# Patient Record
Sex: FEMALE | Race: WHITE | NOT HISPANIC OR LATINO | Employment: UNEMPLOYED | URBAN - NONMETROPOLITAN AREA
[De-identification: names, ages, dates, MRNs, and addresses within clinical notes are randomized per-mention and may not be internally consistent; named-entity substitution may affect disease eponyms.]

---

## 2018-04-11 ENCOUNTER — APPOINTMENT (OUTPATIENT)
Dept: RADIOLOGY | Facility: CLINIC | Age: 43
End: 2018-04-11
Payer: COMMERCIAL

## 2018-04-11 ENCOUNTER — OFFICE VISIT (OUTPATIENT)
Dept: URGENT CARE | Facility: CLINIC | Age: 43
End: 2018-04-11
Payer: COMMERCIAL

## 2018-04-11 VITALS
SYSTOLIC BLOOD PRESSURE: 132 MMHG | RESPIRATION RATE: 18 BRPM | DIASTOLIC BLOOD PRESSURE: 72 MMHG | TEMPERATURE: 99.1 F | OXYGEN SATURATION: 96 % | HEART RATE: 86 BPM

## 2018-04-11 DIAGNOSIS — M25.572 ACUTE LEFT ANKLE PAIN: ICD-10-CM

## 2018-04-11 DIAGNOSIS — S82.832A CLOSED FRACTURE OF DISTAL END OF LEFT FIBULA, UNSPECIFIED FRACTURE MORPHOLOGY, INITIAL ENCOUNTER: Primary | ICD-10-CM

## 2018-04-11 PROCEDURE — 99283 EMERGENCY DEPT VISIT LOW MDM: CPT | Performed by: PHYSICIAN ASSISTANT

## 2018-04-11 PROCEDURE — 29515 APPLICATION SHORT LEG SPLINT: CPT | Performed by: PHYSICIAN ASSISTANT

## 2018-04-11 PROCEDURE — G0382 LEV 3 HOSP TYPE B ED VISIT: HCPCS | Performed by: PHYSICIAN ASSISTANT

## 2018-04-11 PROCEDURE — 73610 X-RAY EXAM OF ANKLE: CPT

## 2018-04-11 NOTE — PROGRESS NOTES
[address]  (office) [phone]  (fax) [phone]        NAME: Don Garcia is a 38 y o  female  : 1975    MRN: [id number]  DATE: 2018  TIME: 6:29 PM    Assessment and Plan   Closed fracture of distal end of left fibula, unspecified fracture morphology, initial encounter [P20 642O]  1  Closed fracture of distal end of left fibula, unspecified fracture morphology, initial encounter     2  Acute left ankle pain  XR ankle 3+ vw left    Splint application       Patient Instructions   Xray shows acute nondisplaced fracture of the distal left fibula  Posterior short leg splint applied  Crutches given to patient and educated on proper use  Educated on splint maintanance and to keep on until she sees ortho  To keep dry  To see ortho ASAP  Given [facility] contact info  To present to the ER if symptoms worsen  Chief Complaint     Chief Complaint   Patient presents with    Ankle Pain     Mario Loge yesterday c/o left ankle pain and swelling  Brooke Vu LPN          History of Present Illness   Don Garcia presents to the clinic c/o    Ankle Injury    The incident occurred 12 to 24 hours ago  The incident occurred at home  The injury mechanism was a fall  The pain is present in the left ankle  The pain is at a severity of 5/10  The pain is moderate  The pain has been constant since onset  Pertinent negatives include no inability to bear weight, loss of motion, loss of sensation, muscle weakness, numbness or tingling  She reports no foreign bodies present  The symptoms are aggravated by movement and weight bearing  She has tried nothing for the symptoms  The treatment provided no relief  Review of Systems   Review of Systems   Constitutional: Negative for activity change, appetite change, chills, diaphoresis, fatigue and fever     HENT: Negative for congestion, ear discharge, ear pain, facial swelling, rhinorrhea, sinus pain, sinus pressure, sneezing and sore throat  Eyes: Negative for photophobia, pain, discharge, redness, itching and visual disturbance  Respiratory: Negative for apnea, cough, chest tightness, shortness of breath and wheezing  Cardiovascular: Negative for chest pain  Gastrointestinal: Negative for abdominal distention, abdominal pain, anal bleeding, blood in stool, constipation, diarrhea, nausea and vomiting  Genitourinary: Negative for dysuria, flank pain, frequency, hematuria and urgency  Musculoskeletal: Positive for arthralgias and gait problem  Negative for back pain, joint swelling, myalgias, neck pain and neck stiffness  Skin: Negative for color change, rash and wound  Allergic/Immunologic: Negative for immunocompromised state  Neurological: Negative for dizziness, tingling, facial asymmetry, numbness and headaches  Hematological: Negative for adenopathy  Psychiatric/Behavioral: Negative for confusion and decreased concentration  Current Medications     No long-term prescriptions on file  Current Allergies     Allergies as of 04/11/2018    (No Known Allergies)            The following portions of the patient's history were reviewed and updated as appropriate: allergies, current medications, past family history, past medical history, past social history, past surgical history and problem list   No past medical history on file  No past surgical history on file  Social History     Social History    Marital status: /Civil Union     Spouse name: N/A    Number of children: N/A    Years of education: N/A     Occupational History    Not on file       Social History Main Topics    Smoking status: Never Smoker    Smokeless tobacco: Never Used    Alcohol use Not on file    Drug use: Unknown    Sexual activity: Not on file     Other Topics Concern    Not on file     Social History Narrative    No narrative on file       Objective   /72   Pulse 86   Temp 99 1 °F (37 3 °C) (Tympanic)   Resp 18   SpO2 96%      Physical Exam     Physical Exam   Constitutional: She is oriented to person, place, and time  She appears well-developed and well-nourished  No distress  HENT:   Head: Normocephalic and atraumatic  Right Ear: Tympanic membrane and external ear normal    Left Ear: Tympanic membrane and external ear normal    Nose: Nose normal    Mouth/Throat: Oropharynx is clear and moist  No oropharyngeal exudate  Eyes: Conjunctivae and EOM are normal  Pupils are equal, round, and reactive to light  Right eye exhibits no discharge  Left eye exhibits no discharge  No scleral icterus  Neck: Normal range of motion  Neck supple  No JVD present  No tracheal deviation present  No thyromegaly present  Cardiovascular: Normal rate, regular rhythm and normal heart sounds  Exam reveals no gallop and no friction rub  No murmur heard  Pulmonary/Chest: Effort normal and breath sounds normal  No stridor  No respiratory distress  She has no wheezes  She has no rales  She exhibits no tenderness  Abdominal: Soft  Bowel sounds are normal  She exhibits no distension and no mass  There is no tenderness  There is no rebound and no guarding  Musculoskeletal: Normal range of motion  She exhibits edema and tenderness  She exhibits no deformity  Feet:    Lymphadenopathy:     She has no cervical adenopathy  Neurological: She is alert and oriented to person, place, and time  She has normal reflexes  Coordination normal    Skin: Skin is warm and dry  No rash noted  She is not diaphoretic  No erythema  No pallor  Psychiatric: She has a normal mood and affect  Her behavior is normal  Judgment and thought content normal    Nursing note and vitals reviewed        Talita Ruiz PA-C

## 2018-04-12 ENCOUNTER — OFFICE VISIT (OUTPATIENT)
Dept: OBGYN CLINIC | Facility: CLINIC | Age: 43
End: 2018-04-12
Payer: COMMERCIAL

## 2018-04-12 VITALS
BODY MASS INDEX: 31.54 KG/M2 | WEIGHT: 178 LBS | HEIGHT: 63 IN | SYSTOLIC BLOOD PRESSURE: 118 MMHG | DIASTOLIC BLOOD PRESSURE: 82 MMHG | HEART RATE: 78 BPM

## 2018-04-12 DIAGNOSIS — S82.65XA CLOSED NONDISPLACED FRACTURE OF LATERAL MALLEOLUS OF LEFT FIBULA, INITIAL ENCOUNTER: Primary | ICD-10-CM

## 2018-04-12 PROCEDURE — 27780 TREATMENT OF FIBULA FRACTURE: CPT | Performed by: ORTHOPAEDIC SURGERY

## 2018-04-12 PROCEDURE — 99203 OFFICE O/P NEW LOW 30 MIN: CPT | Performed by: ORTHOPAEDIC SURGERY

## 2018-04-12 RX ORDER — IBUPROFEN 200 MG
TABLET ORAL EVERY 6 HOURS PRN
COMMUNITY

## 2018-04-12 RX ORDER — MULTIVITAMIN
1 TABLET ORAL DAILY
COMMUNITY

## 2018-04-12 NOTE — ASSESSMENT & PLAN NOTE
Findings consistent with left ankle lateral malleolus nondisplaced fracture  Discussed findings and treatment options with the patient  I reviewed patient's x-ray with her  I discussed prognosis of her left ankle injury  I recommended patient be placed in a short-leg weight-bearing cast for 6 weeks  Cast care instruction was provided  Advised patient to continue ankle elevation and cold compress  Re-evaluate in 6 weeks with x-ray out of the cast   All patient's questions were answered to his satisfaction  This note is created using dictation transcription  It may contain typographical errors, grammatical errors, improperly dictated words, background noise and other errors

## 2018-04-12 NOTE — PROGRESS NOTES
Assessment:     1  Closed nondisplaced fracture of lateral malleolus of left fibula, initial encounter        Plan:     Problem List Items Addressed This Visit        Musculoskeletal and Integument    Closed nondisplaced fracture of lateral malleolus of left fibula - Primary     Findings consistent with left ankle lateral malleolus nondisplaced fracture  Discussed findings and treatment options with the patient  I reviewed patient's x-ray with her  I discussed prognosis of her left ankle injury  I recommended patient be placed in a short-leg weight-bearing cast for 6 weeks  Cast care instruction was provided  Advised patient to continue ankle elevation and cold compress  Re-evaluate in 6 weeks with x-ray out of the cast   All patient's questions were answered to his satisfaction  This note is created using dictation transcription  It may contain typographical errors, grammatical errors, improperly dictated words, background noise and other errors  Relevant Orders    Durable Medical Equipment         Subjective:     Patient ID: Rajeev Barrios is a 38 y o  female  Chief Complaint:  75-year-old white female who injured her left ankle on 4/10/2018 when she missed a step and her left ankle gave out on her     She twisted her left ankle with immediate development pain over the outer aspect of her ankle  Patient was seen in urgent care yesterday and placed in a splint with crutches  She has not been weight-bearing on the left leg  She denies any other injury  Information on patient's intake form was reviewed  Allergy:  No Known Allergies  Medications:  all current active meds have been reviewed  Past Medical History:  History reviewed  No pertinent past medical history  Past Surgical History:  History reviewed  No pertinent surgical history    Family History:  Family History   Problem Relation Age of Onset    Atrial fibrillation Mother     Multiple sclerosis Father      Social History:  History Alcohol use Not on file     History   Drug use: Unknown     History   Smoking Status    Never Smoker   Smokeless Tobacco    Never Used     Review of Systems   Constitutional: Negative  HENT: Negative  Eyes: Negative  Respiratory: Negative  Cardiovascular: Negative  Gastrointestinal: Negative  Endocrine: Negative  Genitourinary: Negative  Musculoskeletal: Positive for arthralgias (Left ankle), gait problem (Using crutches and not weight-bearing on the leftlower leg) and joint swelling (Left ankle)  Skin: Negative  Allergic/Immunologic: Negative  Hematological: Negative  Psychiatric/Behavioral: Negative  Objective:  BP Readings from Last 1 Encounters:   04/12/18 118/82      Wt Readings from Last 1 Encounters:   04/12/18 80 7 kg (178 lb)      BMI:   Estimated body mass index is 31 53 kg/m² as calculated from the following:    Height as of this encounter: 5' 3" (1 6 m)  Weight as of this encounter: 80 7 kg (178 lb)  BSA:   Estimated body surface area is 1 84 meters squared as calculated from the following:    Height as of this encounter: 5' 3" (1 6 m)  Weight as of this encounter: 80 7 kg (178 lb)  Physical Exam   Constitutional: She is oriented to person, place, and time  She appears well-developed  HENT:   Head: Normocephalic and atraumatic  Eyes: EOM are normal  Pupils are equal, round, and reactive to light  Neck: Neck supple  Neurological: She is alert and oriented to person, place, and time  Skin: Skin is warm  Psychiatric: She has a normal mood and affect  Nursing note and vitals reviewed  Right Ankle Exam   Right ankle exam is normal       Left Ankle Exam   Swelling: moderate    Tenderness   The patient is experiencing tenderness in the lateral malleolus (No tenderness over the medial malleolus and the deltoid ligament)       Range of Motion   Dorsiflexion: abnormal   Plantar flexion: abnormal   Inversion: abnormal   Eversion: abnormal Other   Erythema: absent  Sensation: normal  Pulse: present    Comments:  Ecchymosis over the lateral aspect of the ankle  No gross deformity  I have personally reviewed pertinent films in PACS and my interpretation is Left ankle show nondisplaced lateral malleolus fracture  Mortise and syndesmosis are intact       Cast application  Date/Time: 4/12/2018 1:37 PM  Performed by: Yvonne Potter  Authorized by: Yvonne Potter     Consent:     Consent obtained:  Verbal    Consent given by:  Patient  Pre-procedure details:     Sensation:  Normal  Procedure details:     Laterality:  Left    Location:  Ankle    Ankle:  L ankleCast type:  Short leg walking    Supplies:  Cotton padding and fiberglass

## 2018-04-19 ENCOUNTER — TELEPHONE (OUTPATIENT)
Dept: OBGYN CLINIC | Facility: HOSPITAL | Age: 43
End: 2018-04-19

## 2018-04-19 NOTE — TELEPHONE ENCOUNTER
Dr Lexy Quesada - L Lateral Malleolus fx - non displaced seen on 4/12    Patient calling that her swelling goes down when foot is elevated and comes back and seems tight at the ankle when she puts her foot down  She is bearing weight to the leg  Advised that this is normal, elevate and ice  Check cap refill  and foot temp  If Cap refill is >3 seconds and foot is cold, call the office  Patient verbalized understanding  Please advise

## 2018-05-24 ENCOUNTER — APPOINTMENT (OUTPATIENT)
Dept: RADIOLOGY | Facility: CLINIC | Age: 43
End: 2018-05-24
Payer: COMMERCIAL

## 2018-05-24 ENCOUNTER — OFFICE VISIT (OUTPATIENT)
Dept: OBGYN CLINIC | Facility: CLINIC | Age: 43
End: 2018-05-24

## 2018-05-24 VITALS
DIASTOLIC BLOOD PRESSURE: 80 MMHG | HEART RATE: 82 BPM | WEIGHT: 178 LBS | SYSTOLIC BLOOD PRESSURE: 130 MMHG | BODY MASS INDEX: 31.54 KG/M2 | HEIGHT: 63 IN

## 2018-05-24 DIAGNOSIS — S82.65XD NONDISPLACED FRACTURE OF LATERAL MALLEOLUS OF LEFT FIBULA, SUBSEQUENT ENCOUNTER FOR CLOSED FRACTURE WITH ROUTINE HEALING: Primary | ICD-10-CM

## 2018-05-24 DIAGNOSIS — S82.892S CLOSED FRACTURE OF LEFT ANKLE, SEQUELA: ICD-10-CM

## 2018-05-24 PROCEDURE — 99024 POSTOP FOLLOW-UP VISIT: CPT | Performed by: ORTHOPAEDIC SURGERY

## 2018-05-24 PROCEDURE — 73610 X-RAY EXAM OF ANKLE: CPT

## 2018-05-24 NOTE — ASSESSMENT & PLAN NOTE
Findings consistent with left nondisplaced lateral malleolus fracture-healing  Findings and treatment options were discussed with the patient  Short-leg cast was removed  She was given a prescription for a CAM walker to use when she is ambulating  She may remove it at rest for range of motion  She was given a prescription to start physical therapy  Continue ice and elevation  Follow up in 4 weeks with repeat x-rays  We will most likely transition her to an ankle brace at that time  All questions were answered to patient's satisfaction

## 2018-05-24 NOTE — PROGRESS NOTES
Assessment:     1  Nondisplaced fracture of lateral malleolus of left fibula, subsequent encounter for closed fracture with routine healing        Plan:  The patient was seen and examined by Dr Anand Burgos and myself  Problem List Items Addressed This Visit        Musculoskeletal and Integument    Nondisplaced fracture of lateral malleolus of left fibula, subsequent encounter for closed fracture with routine healing - Primary     Findings consistent with left nondisplaced lateral malleolus fracture-healing  Findings and treatment options were discussed with the patient  Short-leg cast was removed  She was given a prescription for a CAM walker to use when she is ambulating  She may remove it at rest for range of motion  She was given a prescription to start physical therapy  Continue ice and elevation  Follow up in 4 weeks with repeat x-rays  We will most likely transition her to an ankle brace at that time  All questions were answered to patient's satisfaction  Relevant Orders    XR ankle 3+ vw left    Cam Boot    Ambulatory referral to Physical Therapy         Subjective:     Patient ID: Kaylee Chao is a 38 y o  female  Chief Complaint:  25-year-old white female who is following up for a left nondisplaced lateral malleolus fracture  She injured her left ankle on 4/10/2018 when she missed a step and her left ankle gave out on her     She twisted her left ankle with immediate development pain over the outer aspect of her ankle  Patient was seen in urgent care yesterday and placed in a splint with crutches  She has been nonweightbearing with a short-leg cast   She tolerated the cast well  Ice and elevation has been helping decrease pain  Allergy:  No Known Allergies  Medications:  all current active meds have been reviewed  Past Medical History:  History reviewed  No pertinent past medical history  Past Surgical History:  History reviewed  No pertinent surgical history    Family History:  Family History   Problem Relation Age of Onset    Atrial fibrillation Mother     Multiple sclerosis Father      Social History:  History   Alcohol use Not on file     History   Drug use: Unknown     History   Smoking Status    Never Smoker   Smokeless Tobacco    Never Used     Review of Systems   Constitutional: Negative  HENT: Negative  Eyes: Negative  Respiratory: Negative  Cardiovascular: Negative  Gastrointestinal: Negative  Endocrine: Negative  Genitourinary: Negative  Musculoskeletal: Positive for arthralgias (Left ankle), gait problem (Using crutches and not weight-bearing on the leftlower leg) and joint swelling (Left ankle)  Skin: Negative  Allergic/Immunologic: Negative  Hematological: Negative  Psychiatric/Behavioral: Negative  Objective:  BP Readings from Last 1 Encounters:   05/24/18 130/80      Wt Readings from Last 1 Encounters:   05/24/18 80 7 kg (178 lb)      BMI:   Estimated body mass index is 31 53 kg/m² as calculated from the following:    Height as of this encounter: 5' 3" (1 6 m)  Weight as of this encounter: 80 7 kg (178 lb)  BSA:   Estimated body surface area is 1 84 meters squared as calculated from the following:    Height as of this encounter: 5' 3" (1 6 m)  Weight as of this encounter: 80 7 kg (178 lb)  Physical Exam   Constitutional: She is oriented to person, place, and time  She appears well-developed  HENT:   Head: Normocephalic and atraumatic  Eyes: EOM are normal  Pupils are equal, round, and reactive to light  Neck: Neck supple  Neurological: She is alert and oriented to person, place, and time  Skin: Skin is warm  Psychiatric: She has a normal mood and affect  Nursing note and vitals reviewed  Right Ankle Exam   Right ankle exam is normal       Left Ankle Exam   Swelling: mild    Tenderness   The patient is experiencing no tenderness (No tenderness over the medial malleolus and the deltoid ligament)       Range of Motion   Dorsiflexion: abnormal   Plantar flexion: abnormal   Inversion: abnormal   Eversion: abnormal     Other   Erythema: absent  Sensation: normal  Pulse: present            I have personally reviewed pertinent films in PACS and my interpretation is Left ankle show nondisplaced lateral malleolus fracture  Mortise and syndesmosis are intact  There is evidence of healing compared to last films  Procedures Short leg cast removed

## 2018-06-14 ENCOUNTER — EVALUATION (OUTPATIENT)
Dept: PHYSICAL THERAPY | Facility: CLINIC | Age: 43
End: 2018-06-14
Payer: COMMERCIAL

## 2018-06-14 DIAGNOSIS — S82.65XD CLOSED NONDISPLACED FRACTURE OF LATERAL MALLEOLUS OF LEFT FIBULA WITH ROUTINE HEALING: Primary | ICD-10-CM

## 2018-06-14 DIAGNOSIS — M25.572 ACUTE LEFT ANKLE PAIN: ICD-10-CM

## 2018-06-14 PROCEDURE — G8978 MOBILITY CURRENT STATUS: HCPCS | Performed by: PHYSICAL THERAPIST

## 2018-06-14 PROCEDURE — G8979 MOBILITY GOAL STATUS: HCPCS | Performed by: PHYSICAL THERAPIST

## 2018-06-14 PROCEDURE — 97535 SELF CARE MNGMENT TRAINING: CPT | Performed by: PHYSICAL THERAPIST

## 2018-06-14 PROCEDURE — 97110 THERAPEUTIC EXERCISES: CPT | Performed by: PHYSICAL THERAPIST

## 2018-06-14 PROCEDURE — 97162 PT EVAL MOD COMPLEX 30 MIN: CPT | Performed by: PHYSICAL THERAPIST

## 2018-06-14 NOTE — LETTER
2018    Inés Bartlett PA-C  [address] [address]    Patient: Margarita Gandara   YOB: 1975   Date of Visit: 2018     Encounter Diagnosis     ICD-10-CM    1  Closed nondisplaced fracture of lateral malleolus of left fibula with routine healing S82 65XD    2  Acute left ankle pain M25 572        Dear Dr Xiomara Low:    Please review the attached Plan of Care from [facility] recent visit  Please verify that you agree therapy should continue by signing the attached document and sending it back to our office  If you have any questions or concerns, please don't hesitate to call  Sincerely,    Jaya Andrew, PT      Referring Provider:      I certify that I have read the below Plan of Care and certify the need for these services furnished under this plan of treatment while under my care  Insé Bartlett PA-C  [address]  [address]  [address] [place] [address] [address] [address] [place]          PT Evaluation     Today's date: 2018  Patient name: Margarita Gandara  : 1975  MRN: [id number]  Referring provider: Dmitriy Castro PA-C  Dx:   Encounter Diagnosis     ICD-10-CM    1  Closed nondisplaced fracture of lateral malleolus of left fibula with routine healing S82 65XD    2  Acute left ankle pain M25 572        Start Time: 0800  Stop Time: 0900  Total time in clinic (min): 60 minutes    Assessment  Impairments: abnormal gait, abnormal or restricted ROM, activity intolerance, impaired balance, impaired physical strength, lacks appropriate home exercise program, pain with function and weight-bearing intolerance    Assessment details: Pt presents s/p fall on 18 resulting in closed non-displaced fracture of lateral malleolus of left fibula, currently WBAT in CAM boot on LLE   PT notes the patient demonstrates decreased gait mechanics ambulating with CAM boot and no AD, decreased weight bearing tolerance, decreased AROM of the L ankle, decreased mm strength, impaired balance, and decreased functional mobility  Pt would benefit from skilled PT to improve mm strength, ROM, balance, gait mechanics, and functional mobility  Understanding of Dx/Px/POC: good   Prognosis: good    Goals  STG  1  Pt will demonstrate 0/10 pain with ADLs and functional mobility  2  Pt will improve AROM of the L ankle to WNL  3  Initiate HEP with good tolerance from pt    LTG   1  Pt will improve standing static and dynamic balance to WNL  2  Pt will demonstrate 5/5 t/o BLE   3  Pt will be independent with HEP and appropriate for d/c    Plan  Patient would benefit from: PT eval and skilled physical therapy  Planned modality interventions: cryotherapy, unattended electrical stimulation and thermotherapy: hydrocollator packs  Planned therapy interventions: joint mobilization, massage, manual therapy, neuromuscular re-education, balance, balance/weight bearing training, flexibility, gait training, graded exercise, home exercise program, patient education, postural training, strengthening, stretching and therapeutic exercise  Frequency: 2x week  Duration in visits: 8  Duration in weeks: 4  Treatment plan discussed with: patient  Plan details: Discussed findings of POC with pt, pt agreeable to skilled PT 2x/wk for 4 wks        Subjective Evaluation    History of Present Illness  Date of onset: 2018  Mechanism of injury: Pt presents s/p fall resulting in non-displaced fx of the L lateral malleolus on 18  Pt reports she was going down on the stairs and felt the ankle give out while she was stepping down onto her left foot  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, and has rolled her L ankle several times, but none as serious as this  Pt was originally placed in short cast after injury, but was transitioned to CAM boot at recent f/u appt with MD on   Pt is scheduled to f/u with MD on      Not a recurrent problem   Quality of life: good    Pain  Current pain ratin  At best pain ratin  At worst pain ratin  Location: Left ankle  Quality: discomfort  Relieving factors: rest  Aggravating factors: standing and walking  Progression: improved      Diagnostic Tests  X-ray: abnormal  Treatments  No previous or current treatments  Patient Goals  Patient goals for therapy: decreased edema, decreased pain, improved balance, increased motion, increased strength and return to sport/leisure activities        Objective     Palpation     Additional Palpation Details  Pt notes no TTP of b/l LE musculature     Tenderness   Left Ankle/Foot   Tenderness in the lateral malleolus  Right Ankle/Foot   No tenderness  Additional Tenderness Details  TTP noted over L lateral malleolus, no other tenderness noted     Neurological Testing     Reflexes   Left   Patellar (L4): normal (2+)  Achilles (S1): normal (2+)    Right   Patellar (L4): normal (2+)  Achilles (S1): normal (2+)    Active Range of Motion   Left Ankle/Foot   Dorsiflexion (ke): 15 degrees   Plantar flexion: 45 degrees   Inversion: 30 degrees   Eversion: 15 degrees     Right Ankle/Foot   Dorsiflexion (ke): 20 degrees   Plantar flexion: 50 degrees   Inversion: 30 degrees   Eversion: 25 degrees     Additional Active Range of Motion Details  PT notes the pt with slightly decreased AROM of the L ankle compared to R ankle, no pain with AROM    Strength/Myotome Testing     Left Ankle/Foot   Dorsiflexion: 3+  Plantar flexion: 3+  Inversion: 3+  Eversion: 3+    Right Ankle/Foot   Normal strength    Additional Strength Details  Pt noted "stiffness" with MMT of the L ankle  B/L hip and knee mm strength WNL    Swelling   Left Ankle/Foot   Figure 8: 56 cm    Right Ankle/Foot   Figure 8: 55 cm    Ambulation   Weight-Bearing Status   Weight-Bearing Status (Left): weight-bearing as tolerated   with CAM boot    Additional Weight-Bearing Status Details  CAM boot on LLE   Pt notes she is allowed to remove CAM boot for ROM     Ambulation: Level Surfaces Ambulation with assistive device: independent  Ambulation without assistive device: independent    Observational Gait   Gait: antalgic   Walking speed and stride length within functional limits  Decreased left step length     Left foot contact pattern: foot flat  Left arm swing: within functional limits  Right arm swing: within functional limits  Base of support: normal      Flowsheet Rows      Most Recent Value   PT/OT G-Codes   Current Score  47   Projected Score  64   FOTO information reviewed  Yes   Assessment Type  Evaluation   G code set  Mobility: Walking & Moving Around   Mobility: Walking and Moving Around Current Status ()  CK   Mobility: Walking and Moving Around Goal Status ()  CJ          Precautions: WBAT LLE in CAM boot    Specialty Daily Treatment Diary     Manual  6/14/18       L foot/ankle PROM and joint mobs                            Exercise Diary  6/14/18       [address] [facility] L2 10 min       TR/HR        Standing SLR 3-way        Rocker Board        BAPS Board        Foam Roll        Golf Ball Roll        TB Ankle 4-way        Ankle MREs        Fwd/Lat Step Ups        Step Up and Over        Eccentric Step Downs        Tandem/SLS on Foam        Biodex        Bosu March        Bosu Lungluann                                            Modalities 6/14/18       CP to L ankle

## 2018-06-14 NOTE — PROGRESS NOTES
PT Evaluation     Today's date: 2018  Patient name: Woodrow Amaral  : 1975  MRN: [id number]  Referring provider: Jeremiah Baker PA-C  Dx:   Encounter Diagnosis     ICD-10-CM    1  Closed nondisplaced fracture of lateral malleolus of left fibula with routine healing S82 65XD    2  Acute left ankle pain M25 572        Start Time: 0800  Stop Time: 0900  Total time in clinic (min): 60 minutes    Assessment  Impairments: abnormal gait, abnormal or restricted ROM, activity intolerance, impaired balance, impaired physical strength, lacks appropriate home exercise program, pain with function and weight-bearing intolerance    Assessment details: Pt presents s/p fall on 18 resulting in closed non-displaced fracture of lateral malleolus of left fibula, currently WBAT in CAM boot on LLE  PT notes the patient demonstrates decreased gait mechanics ambulating with CAM boot and no AD, decreased weight bearing tolerance, decreased AROM of the L ankle, decreased mm strength, impaired balance, and decreased functional mobility  Pt would benefit from skilled PT to improve mm strength, ROM, balance, gait mechanics, and functional mobility  Understanding of Dx/Px/POC: good   Prognosis: good    Goals  STG  1  Pt will demonstrate 0/10 pain with ADLs and functional mobility  2  Pt will improve AROM of the L ankle to WNL  3  Initiate HEP with good tolerance from pt    LTG   1  Pt will improve standing static and dynamic balance to WNL  2  Pt will demonstrate 5/5 t/o BLE   3   Pt will be independent with HEP and appropriate for d/c    Plan  Patient would benefit from: PT eval and skilled physical therapy  Planned modality interventions: cryotherapy, unattended electrical stimulation and thermotherapy: hydrocollator packs  Planned therapy interventions: joint mobilization, massage, manual therapy, neuromuscular re-education, balance, balance/weight bearing training, flexibility, gait training, graded exercise, home exercise program, patient education, postural training, strengthening, stretching and therapeutic exercise  Frequency: 2x week  Duration in visits: 8  Duration in weeks: 4  Treatment plan discussed with: patient  Plan details: Discussed findings of POC with pt, pt agreeable to skilled PT 2x/wk for 4 wks        Subjective Evaluation    History of Present Illness  Date of onset: 2018  Mechanism of injury: Pt presents s/p fall resulting in non-displaced fx of the L lateral malleolus on 18  Pt reports she was going down on the stairs and felt the ankle give out while she was stepping down onto her left foot  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, and has rolled her L ankle several times, but none as serious as this  Pt was originally placed in short cast after injury, but was transitioned to CAM boot at recent f/u appt with MD on   Pt is scheduled to f/u with MD on   Not a recurrent problem   Quality of life: good    Pain  Current pain ratin  At best pain ratin  At worst pain ratin  Location: Left ankle  Quality: discomfort  Relieving factors: rest  Aggravating factors: standing and walking  Progression: improved      Diagnostic Tests  X-ray: abnormal  Treatments  No previous or current treatments  Patient Goals  Patient goals for therapy: decreased edema, decreased pain, improved balance, increased motion, increased strength and return to sport/leisure activities        Objective     Palpation     Additional Palpation Details  Pt notes no TTP of b/l LE musculature     Tenderness   Left Ankle/Foot   Tenderness in the lateral malleolus  Right Ankle/Foot   No tenderness       Additional Tenderness Details  TTP noted over L lateral malleolus, no other tenderness noted     Neurological Testing     Reflexes   Left   Patellar (L4): normal (2+)  Achilles (S1): normal (2+)    Right   Patellar (L4): normal (2+)  Achilles (S1): normal (2+)    Active Range of Motion   Left Ankle/Foot Dorsiflexion (ke): 15 degrees   Plantar flexion: 45 degrees   Inversion: 30 degrees   Eversion: 15 degrees     Right Ankle/Foot   Dorsiflexion (ke): 20 degrees   Plantar flexion: 50 degrees   Inversion: 30 degrees   Eversion: 25 degrees     Additional Active Range of Motion Details  PT notes the pt with slightly decreased AROM of the L ankle compared to R ankle, no pain with AROM    Strength/Myotome Testing     Left Ankle/Foot   Dorsiflexion: 3+  Plantar flexion: 3+  Inversion: 3+  Eversion: 3+    Right Ankle/Foot   Normal strength    Additional Strength Details  Pt noted "stiffness" with MMT of the L ankle  B/L hip and knee mm strength WNL    Swelling   Left Ankle/Foot   Figure 8: 56 cm    Right Ankle/Foot   Figure 8: 55 cm    Ambulation   Weight-Bearing Status   Weight-Bearing Status (Left): weight-bearing as tolerated   with CAM boot    Additional Weight-Bearing Status Details  CAM boot on LLE  Pt notes she is allowed to remove CAM boot for ROM     Ambulation: Level Surfaces   Ambulation with assistive device: independent  Ambulation without assistive device: independent    Observational Gait   Gait: antalgic   Walking speed and stride length within functional limits  Decreased left step length     Left foot contact pattern: foot flat  Left arm swing: within functional limits  Right arm swing: within functional limits  Base of support: normal      Flowsheet Rows      Most Recent Value   PT/OT G-Codes   Current Score  47   Projected Score  64   FOTO information reviewed  Yes   Assessment Type  Evaluation   G code set  Mobility: Walking & Moving Around   Mobility: Walking and Moving Around Current Status ()  CK   Mobility: Walking and Moving Around Goal Status ()  CJ          Precautions: WBAT LLE in CAM boot    Specialty Daily Treatment Diary     Manual  6/14/18       L foot/ankle PROM and joint mobs                            Exercise Diary  6/14/18       [address] [facility] L2 10 min       TR/HR        Standing SLR 3-way        Rocker Board        BAPS Board        Foam Roll        Golf [employer]        TB Ankle 4-way        Ankle MREs        Fwd/Lat Step Ups        Step Up and Over        Eccentric Step Downs        Tandem/SLS on [employer]        Biodex        Bosu Evelyn Green Lungluann                                            Modalities 6/14/18       CP to L ankle

## 2018-06-19 ENCOUNTER — OFFICE VISIT (OUTPATIENT)
Dept: PHYSICAL THERAPY | Facility: CLINIC | Age: 43
End: 2018-06-19
Payer: COMMERCIAL

## 2018-06-19 DIAGNOSIS — M25.572 ACUTE LEFT ANKLE PAIN: ICD-10-CM

## 2018-06-19 DIAGNOSIS — S82.65XD CLOSED NONDISPLACED FRACTURE OF LATERAL MALLEOLUS OF LEFT FIBULA WITH ROUTINE HEALING: Primary | ICD-10-CM

## 2018-06-19 PROCEDURE — 97140 MANUAL THERAPY 1/> REGIONS: CPT | Performed by: PHYSICAL THERAPIST

## 2018-06-19 PROCEDURE — 97110 THERAPEUTIC EXERCISES: CPT | Performed by: PHYSICAL THERAPIST

## 2018-06-19 NOTE — PROGRESS NOTES
Daily Note     Today's date: 2018  Patient name: Jeffrey Nguyen  : 1975  MRN: [id number]  Referring provider: Jacklyn Camejo PA-C  Dx:   Encounter Diagnosis     ICD-10-CM    1  Closed nondisplaced fracture of lateral malleolus of left fibula with routine healing S82 65XD    2  Acute left ankle pain M25 572        Start Time: 0800  Stop Time: 0900  Total time in clinic (min): 60 minutes    Subjective: Pt reports she did a lot of walking over the weekend and note she does have some tightness and soreness in the L ankle  Pt notes she was also working so she did not have time to complete the stretches  Objective: See treatment diary below    Precautions: WBAT LLE in CAM boot     Specialty Daily Treatment Diary      Manual  18         L foot/ankle PROM and joint mobs    15 min                                           Exercise Diary  18         SRC L2 10 min  L2 10 min         TR/HR   Seated  2x10         Standing SLR 3-way   2x10 each with CAM boot         Rocker Board    NV         BAPS Board    20x each         Foam Roll    3 min         Golf Ball Roll    3 min         Ankle 4-way    2x10 1#         Ankle MREs             Fwd/Lat Step Ups   2x10 each in CAM boot         Step Up and Over             Eccentric Step Downs             Tandem/SLS on Foam    NV         Biodex             Bosu March             Bosu Lunge                                                                           Modalities 18         CP to L ankle    Declined                                            Assessment: Tolerated treatment well  Patient exhibited good technique with therapeutic exercises and would benefit from continued PT  Pt tolerated exercises well and noted she could feel a stretch in the ankle, especially on the top of the foot  Pt educated to continue with HEP and CP prn  Pt will bring sneaker for L foot NV to perform additional exercises in standing        Plan: Continue per plan of care  Progress treatment as tolerated

## 2018-06-21 ENCOUNTER — OFFICE VISIT (OUTPATIENT)
Dept: PHYSICAL THERAPY | Facility: CLINIC | Age: 43
End: 2018-06-21
Payer: COMMERCIAL

## 2018-06-21 DIAGNOSIS — S82.65XD CLOSED NONDISPLACED FRACTURE OF LATERAL MALLEOLUS OF LEFT FIBULA WITH ROUTINE HEALING: Primary | ICD-10-CM

## 2018-06-21 DIAGNOSIS — M25.572 ACUTE LEFT ANKLE PAIN: ICD-10-CM

## 2018-06-21 PROCEDURE — 97110 THERAPEUTIC EXERCISES: CPT | Performed by: PHYSICAL THERAPIST

## 2018-06-21 PROCEDURE — 97140 MANUAL THERAPY 1/> REGIONS: CPT | Performed by: PHYSICAL THERAPIST

## 2018-06-21 NOTE — PROGRESS NOTES
Daily Note     Today's date: 2018  Patient name: Marcos Roe  : 1975  MRN: [id number]  Referring provider: Collin Dozier PA-C  Dx:   Encounter Diagnosis     ICD-10-CM    1  Closed nondisplaced fracture of lateral malleolus of left fibula with routine healing S82 65XD    2  Acute left ankle pain M25 572                   Subjective:  Patient reports her low back continues to be sore and tight because of walking with the CAM boot  Patient reports she is going to f/u with MD today for further evaluation of the left ankle and patient is hoping to get out of boot and into sneakers  Patient reports no pain in the left ankle but 5/10 pain levels in the low back  Post session the patient reports 3/10 pain levels in the low back  Objective: See treatment diary below    Precautions: WBAT LLE in CAM boot     Specialty Daily Treatment Diary      Manual  18       L foot/ankle PROM and joint mobs    15 min  15 min                                          Exercise Diary  18       SRC L2 10 min  L2 10 min  L3 10  Min        TR/HR   Seated  2x10  Seated 2x15        Standing SLR 3-way   2x10 each with CAM boot  2x10 Bilat   With CAM Boot        Rocker Board    NV  20x S/S only in CAM boot        BAPS Board    20x each  20x each        Foam Roll    3 min  3 min        Golf Ball Roll    3 min  3 min        Ankle 4-way    2x10 1#  2x10 1 5#        Ankle MREs             Fwd/Lat Step Ups   2x10 each in CAM boot  2x10 Bilat 6" step in Cam Boot        Step Up and Over             Eccentric Step Downs             Tandem/SLS on Foam    NV  4x15" Hold  Bilat   Tandem Only        Biodex             Bosu March             Bosu Lunge              DF Heel slides       10x10" Hold                                                        Modalities 18       CP to L ankle    Declined  15 min                                          Assessment: Tolerated treatment well    PT notes continuation of progression of TE program with focus on gait/balance and manual therapy to decrease pain levels and improve functional limitations to meet therapy goals  PT notes the patient with continuation of medial and lateral ankle weakness with need for continuation of skilled therapy  Plan: Continue per plan of care

## 2018-06-22 ENCOUNTER — OFFICE VISIT (OUTPATIENT)
Dept: OBGYN CLINIC | Facility: CLINIC | Age: 43
End: 2018-06-22

## 2018-06-22 ENCOUNTER — APPOINTMENT (OUTPATIENT)
Dept: RADIOLOGY | Facility: CLINIC | Age: 43
End: 2018-06-22
Payer: COMMERCIAL

## 2018-06-22 ENCOUNTER — TELEPHONE (OUTPATIENT)
Dept: OBGYN CLINIC | Facility: HOSPITAL | Age: 43
End: 2018-06-22

## 2018-06-22 VITALS
HEIGHT: 63 IN | DIASTOLIC BLOOD PRESSURE: 72 MMHG | SYSTOLIC BLOOD PRESSURE: 117 MMHG | HEART RATE: 84 BPM | BODY MASS INDEX: 32.6 KG/M2 | WEIGHT: 184 LBS

## 2018-06-22 DIAGNOSIS — S82.822D CLOSED TORUS FRACTURE OF DISTAL END OF LEFT FIBULA WITH ROUTINE HEALING, SUBSEQUENT ENCOUNTER: Primary | ICD-10-CM

## 2018-06-22 DIAGNOSIS — S82.65XD NONDISPLACED FRACTURE OF LATERAL MALLEOLUS OF LEFT FIBULA, SUBSEQUENT ENCOUNTER FOR CLOSED FRACTURE WITH ROUTINE HEALING: Primary | ICD-10-CM

## 2018-06-22 DIAGNOSIS — S82.65XD NONDISPLACED FRACTURE OF LATERAL MALLEOLUS OF LEFT FIBULA, SUBSEQUENT ENCOUNTER FOR CLOSED FRACTURE WITH ROUTINE HEALING: ICD-10-CM

## 2018-06-22 PROCEDURE — 73610 X-RAY EXAM OF ANKLE: CPT

## 2018-06-22 PROCEDURE — 99024 POSTOP FOLLOW-UP VISIT: CPT | Performed by: ORTHOPAEDIC SURGERY

## 2018-06-22 NOTE — ASSESSMENT & PLAN NOTE
Findings consistent with left nondisplaced lateral malleolus fracture-healed  Findings and treatment options were discussed with the patient  She is doing well  She may discontinue the Cam boot and she was given a prescription for an ankle brace to use with activities  Continue physical therapy  Continue ice and elevation as needed  Follow up in 6 weeks for re-evaluation  All questions were answered to her satisfaction

## 2018-06-22 NOTE — TELEPHONE ENCOUNTER
[facility] is calling stating that the patient has a non par insurance for them and will not be able to have her orders fulfilled through them ([facility])    Lala Flurry pt

## 2018-06-22 NOTE — PROGRESS NOTES
Assessment:     1  Nondisplaced fracture of lateral malleolus of left fibula, subsequent encounter for closed fracture with routine healing        Plan:  The patient was seen and examined by Dr Dandy Diez and myself  Problem List Items Addressed This Visit        Musculoskeletal and Integument    Nondisplaced fracture of lateral malleolus of left fibula, subsequent encounter for closed fracture with routine healing - Primary     Findings consistent with left nondisplaced lateral malleolus fracture-healed  Findings and treatment options were discussed with the patient  She is doing well  She may discontinue the Cam boot and she was given a prescription for an ankle brace to use with activities  Continue physical therapy  Continue ice and elevation as needed  Follow up in 6 weeks for re-evaluation  All questions were answered to her satisfaction  Relevant Orders    XR ankle 3+ vw left         Subjective:     Patient ID: Patricia Musa is a 37 y o  female  Chief Complaint:  68-year-old white female who is following up for a left nondisplaced lateral malleolus fracture  She injured her left ankle on 4/10/2018 when she missed a step and her left ankle gave out on her     She twisted her left ankle with immediate development pain over the outer aspect of her ankle  Patient was seen in urgent care yesterday and placed in a splint with crutches  She has been weight-bearing as tolerated with the Cam Walker  She started physical therapy as well  She has no pain at this time  She does continue to have some stiffness of her ankle  Allergy:  No Known Allergies  Medications:  all current active meds have been reviewed  Past Medical History:  History reviewed  No pertinent past medical history  Past Surgical History:  History reviewed  No pertinent surgical history    Family History:  Family History   Problem Relation Age of Onset    Atrial fibrillation Mother     Multiple sclerosis Father      Social History:  History   Alcohol use Not on file     History   Drug use: Unknown     History   Smoking Status    Never Smoker   Smokeless Tobacco    Never Used     Review of Systems   Constitutional: Negative  HENT: Negative  Eyes: Negative  Respiratory: Negative  Cardiovascular: Negative  Gastrointestinal: Negative  Endocrine: Negative  Genitourinary: Negative  Musculoskeletal: Positive for arthralgias (Left ankle)  Skin: Negative  Allergic/Immunologic: Negative  Neurological: Negative  Hematological: Negative  Psychiatric/Behavioral: Negative  Objective:  BP Readings from Last 1 Encounters:   06/22/18 117/72      Wt Readings from Last 1 Encounters:   06/22/18 83 5 kg (184 lb)      BMI:   Estimated body mass index is 32 59 kg/m² as calculated from the following:    Height as of this encounter: 5' 3" (1 6 m)  Weight as of this encounter: 83 5 kg (184 lb)  BSA:   Estimated body surface area is 1 87 meters squared as calculated from the following:    Height as of this encounter: 5' 3" (1 6 m)  Weight as of this encounter: 83 5 kg (184 lb)  Physical Exam   Constitutional: She is oriented to person, place, and time  She appears well-developed  HENT:   Head: Normocephalic and atraumatic  Eyes: EOM are normal  Pupils are equal, round, and reactive to light  Neck: Neck supple  Neurological: She is alert and oriented to person, place, and time  Skin: Skin is warm  Psychiatric: She has a normal mood and affect  Nursing note and vitals reviewed  Right Ankle Exam   Right ankle exam is normal       Left Ankle Exam   Swelling: none    Tenderness   The patient is experiencing no tenderness (No tenderness over the medial malleolus and the deltoid ligament)  Range of Motion   Dorsiflexion: 20   Plantar flexion: 40     Muscle Strength   The patient has normal left ankle strength      Other   Erythema: absent  Sensation: normal  Pulse: present            I have personally reviewed pertinent films in PACS and my interpretation is Left ankle show nondisplaced lateral malleolus fracture  Mortise and syndesmosis are intact  There is evidence of bone callus formation      Procedures

## 2018-06-26 ENCOUNTER — OFFICE VISIT (OUTPATIENT)
Dept: PHYSICAL THERAPY | Facility: CLINIC | Age: 43
End: 2018-06-26
Payer: COMMERCIAL

## 2018-06-26 DIAGNOSIS — S82.65XD CLOSED NONDISPLACED FRACTURE OF LATERAL MALLEOLUS OF LEFT FIBULA WITH ROUTINE HEALING: Primary | ICD-10-CM

## 2018-06-26 DIAGNOSIS — M25.572 ACUTE LEFT ANKLE PAIN: ICD-10-CM

## 2018-06-26 PROCEDURE — 97110 THERAPEUTIC EXERCISES: CPT | Performed by: PHYSICAL THERAPIST

## 2018-06-26 PROCEDURE — 97140 MANUAL THERAPY 1/> REGIONS: CPT | Performed by: PHYSICAL THERAPIST

## 2018-06-26 NOTE — PROGRESS NOTES
Daily Note     Today's date: 2018  Patient name: Marcos Roe  : 1975  MRN: [id number]  Referring provider: Collin Dozier PA-C  Dx:   Encounter Diagnosis     ICD-10-CM    1  Closed nondisplaced fracture of lateral malleolus of left fibula with routine healing S82 65XD    2  Acute left ankle pain M25 572        Start Time: 1700  Stop Time: 1810  Total time in clinic (min): 70 minutes    Subjective: Pt reports she is still having back pain and can notice her sciatic nerve acting up due to the leg length difference from the CAM boot   Pt has to return to MD to be fitted for cast       Objective: See treatment diary below  Precautions: WBAT LLE in CAM boot     Specialty Daily Treatment Diary      Manual  18     L foot/ankle PROM and joint mobs    15 min  15 min  15 min                                       Exercise Diary  18     SRC L2 10 min  L2 10 min  L3 10 min     TR/HR   Seated  2x10  Seated 2x15  Seated  2x15     Standing SLR 3-way   2x10 each with CAM boot  2x10 Bilat   With CAM Boot  2x10 Bilat   With CAM boot     Rocker Board    NV  20x S/S only in CAM boot  20x S/S only in CAM boot     BAPS Board    20x each  20x each   20x each     Foam Roll    3 min  3 min   3 min     Golf Ball Roll    3 min  3 min   3 min     Ankle 4-way    2x10 1#  2x10 1 5#  2x10     Ankle MREs             Fwd/Lat Step Ups   2x10 each in CAM boot  2x10 Bilat 6" step in Cam Boot  2x10 Bilat 6" step in CAM boot     Step Up and Over             Eccentric Step Downs             Tandem/SLS on Foam    NV  4x15" Hold  Bilat   Tandem Only  4x15" hold  Bilat  Tandem Only     Biodex             Bosu March             Bosu Lunge              DF Heel slides       10x10" Hold   10x10" hold                                                     Modalities 18     CP to L ankle    Declined  15 min  15 min                                       Assessment: Tolerated treatment well  Patient exhibited good technique with therapeutic exercises and would benefit from continued PT  Pt noted minor soreness post treatment especially on top of ankle  Pt reported relief with manual techniques and cold pack  Plan: Continue per plan of care  Progress treatment as tolerated

## 2018-06-27 ENCOUNTER — OFFICE VISIT (OUTPATIENT)
Dept: PHYSICAL THERAPY | Facility: CLINIC | Age: 43
End: 2018-06-27
Payer: COMMERCIAL

## 2018-06-27 DIAGNOSIS — S82.65XD CLOSED NONDISPLACED FRACTURE OF LATERAL MALLEOLUS OF LEFT FIBULA WITH ROUTINE HEALING: Primary | ICD-10-CM

## 2018-06-27 DIAGNOSIS — M25.572 ACUTE LEFT ANKLE PAIN: ICD-10-CM

## 2018-06-27 PROCEDURE — 97140 MANUAL THERAPY 1/> REGIONS: CPT

## 2018-06-27 PROCEDURE — 97110 THERAPEUTIC EXERCISES: CPT

## 2018-06-27 NOTE — PROGRESS NOTES
Daily Note     Today's date: 2018  Patient name: Margarita Gandara  : 1975  MRN: [id number]  Referring provider: Dmitriy Castro PA-C  Dx:   Encounter Diagnosis     ICD-10-CM    1  Closed nondisplaced fracture of lateral malleolus of left fibula with routine healing S82 65XD    2  Acute left ankle pain M25 572        Start Time: 1605          Subjective: Patient reported Dr HOLCOMB/CHRISTIANO cam walker boot and now it feels weird being out of it        Objective: See treatment diary below    Precautions: WBAT LLE in CAM boot     Specialty Daily Treatment Diary      Manual  18   L foot/ankle PROM and joint mobs    15 min  15 min  15 min  15 min                                     Exercise Diary  18   SRC L2 10 min  L2 10 min  L3 10 min  L3 10 min   TR/HR   Seated  2x10  Seated 2x15  Seated  2x15     Standing SLR 3-way   2x10 each with CAM boot  2x10 Bilat   With CAM Boot  2x10 Bilat   With CAM boot  2x10  On foam     Rocker Board    NV  20x S/S only in CAM boot  20x S/S only in CAM boot  20x   BAPS Board    20x each  20x each   20x each  20x each   Foam Roll    3 min  3 min   3 min  3 min   Golf Ball Roll    3 min  3 min   3 min  3 min   Ankle 4-way    2x10 1#  2x10 1 5#  2x10  2x10 #1 5    Ankle MREs             Fwd/Lat Step Ups   2x10 each in CAM boot  2x10 Bilat 6" step in Cam Boot  2x10 Bilat 6" step in CAM boot  6' step  2x10   Step Up and Over             Eccentric Step Downs             Tandem/SLS on Foam    NV  4x15" Hold  Bilat   Tandem Only  4x15" hold  Bilat  Tandem Only  4x 15" hd  bilat   Tandem only   Biodex             Bosu March             Bosu Lunge              DF Heel slides       10x10" Hold   10x10" hold  10x 10" hd                                                Modalities 18   CP to L ankle    Declined  15 min  15 min  15 min                                     Assessment: patient was able to perform more dynamic activities today in close chain with boot off  Patient hampered by left sciatic pain  Will monitor pain levels and progress as able in upcoming visits  Plan: Continue per plan of care  Progress treatment as tolerated

## 2018-06-29 ENCOUNTER — TELEPHONE (OUTPATIENT)
Dept: OBGYN CLINIC | Facility: HOSPITAL | Age: 43
End: 2018-06-29

## 2018-06-29 DIAGNOSIS — S82.65XD NONDISPLACED FRACTURE OF LATERAL MALLEOLUS OF LEFT FIBULA, SUBSEQUENT ENCOUNTER FOR CLOSED FRACTURE WITH ROUTINE HEALING: Primary | ICD-10-CM

## 2018-06-29 NOTE — TELEPHONE ENCOUNTER
She needs to wear a shoe that match the height of the CAM walker  I will give her a script for therapy for gait training

## 2018-06-29 NOTE — TELEPHONE ENCOUNTER
Pt  Is having Sciatic nerve pain since wearing the boot for her fracture Pt  Is requesting a script for Physical therapy  Pt  Is already going to physical therapy for fracture  Please advise   Thank you

## 2018-07-03 ENCOUNTER — OFFICE VISIT (OUTPATIENT)
Dept: PHYSICAL THERAPY | Facility: CLINIC | Age: 43
End: 2018-07-03
Payer: COMMERCIAL

## 2018-07-03 DIAGNOSIS — M25.572 ACUTE LEFT ANKLE PAIN: ICD-10-CM

## 2018-07-03 DIAGNOSIS — S82.65XD CLOSED NONDISPLACED FRACTURE OF LATERAL MALLEOLUS OF LEFT FIBULA WITH ROUTINE HEALING: Primary | ICD-10-CM

## 2018-07-03 PROCEDURE — 97110 THERAPEUTIC EXERCISES: CPT

## 2018-07-03 PROCEDURE — 97140 MANUAL THERAPY 1/> REGIONS: CPT

## 2018-07-03 PROCEDURE — 97112 NEUROMUSCULAR REEDUCATION: CPT

## 2018-07-03 NOTE — PROGRESS NOTES
Daily Note     Today's date: 7/3/2018  Patient name: Yeimi Malcolm  : 1975  MRN: [id number]  Referring provider: Noemi Rangel PA-C  Dx:   Encounter Diagnosis     ICD-10-CM    1  Closed nondisplaced fracture of lateral malleolus of left fibula with routine healing S82 65XD    2  Acute left ankle pain M25 572        Start Time: 0705          Subjective: Patient denied any pain in her ankle but that her sciatic pain is bothering her  She stated she made plans to go to chiropractor  Objective: See treatment diary below    Precautions: WBAT LLE in CAM boot     Specialty Daily Treatment Diary      Manual  7/3     6/27   L foot/ankle PROM and joint mobs 15 min     15 min                                     Exercise Diary  7/3     6/27   [address] [facility] L4 10min     L3 10 min   TR/HR         Standing SLR 3-way on foam 2x10  1#     2x10  On foam     Rocker Board 20x     20x   BAPS Board D/C     20x each   Foam Roll D/C     3 min   Golf [employer] D/C     3 min   Ankle 4-way 20x 2#     2x10 #1 5    Ankle MREs         Fwd/Lat Step Ups 6" step  2x10     6' step  2x10   Step Up and Over         Eccentric Step Downs 2x10  4' step        SLS on Foam 4x 15" hd     4x 15" hd  bilat   Tandem only   Biodex         Bosu March 2 min        Bosu Lunge 10x         DF Heel slides       10x 10" hd   Single limb cone tap 10x  bilat                                Modalities 7/3     6/27   CP to L ankle 15 min     15 min                                 Assessment: Continue to challenge patient with more neuromuscular reeducation activities  Patient continues to be challenged by dynamic activities  Will continue to monitor and challenge in upcoming visits       Plan: Continue per plan of care  Progress treatment as tolerated

## 2018-07-04 ENCOUNTER — HOSPITAL ENCOUNTER (EMERGENCY)
Facility: HOSPITAL | Age: 43
Discharge: HOME | End: 2018-07-04
Attending: EMERGENCY MEDICINE
Payer: COMMERCIAL

## 2018-07-04 VITALS
HEART RATE: 85 BPM | RESPIRATION RATE: 18 BRPM | OXYGEN SATURATION: 98 % | HEIGHT: 63 IN | DIASTOLIC BLOOD PRESSURE: 82 MMHG | WEIGHT: 184.08 LBS | BODY MASS INDEX: 32.62 KG/M2 | TEMPERATURE: 98.1 F | SYSTOLIC BLOOD PRESSURE: 150 MMHG

## 2018-07-04 VITALS
WEIGHT: 184.08 LBS | SYSTOLIC BLOOD PRESSURE: 144 MMHG | TEMPERATURE: 99.2 F | OXYGEN SATURATION: 96 % | RESPIRATION RATE: 16 BRPM | HEART RATE: 89 BPM | DIASTOLIC BLOOD PRESSURE: 70 MMHG | BODY MASS INDEX: 32.61 KG/M2

## 2018-07-04 DIAGNOSIS — S33.5XXA LUMBAR SPRAIN, INITIAL ENCOUNTER: ICD-10-CM

## 2018-07-04 DIAGNOSIS — M54.30 SCIATICA: Primary | ICD-10-CM

## 2018-07-04 DIAGNOSIS — M54.16 LUMBAR RADICULOPATHY: Primary | ICD-10-CM

## 2018-07-04 PROCEDURE — 96372 THER/PROPH/DIAG INJ SC/IM: CPT

## 2018-07-04 PROCEDURE — 99283 EMERGENCY DEPT VISIT LOW MDM: CPT

## 2018-07-04 RX ORDER — METHOCARBAMOL 500 MG/1
500 TABLET, FILM COATED ORAL 3 TIMES DAILY
Qty: 21 TABLET | Refills: 0 | Status: SHIPPED | OUTPATIENT
Start: 2018-07-04 | End: 2018-07-11

## 2018-07-04 RX ORDER — LIDOCAINE 50 MG/G
1 PATCH TOPICAL DAILY
Qty: 7 PATCH | Refills: 0 | Status: SHIPPED | OUTPATIENT
Start: 2018-07-04 | End: 2018-07-11

## 2018-07-04 RX ORDER — LIDOCAINE 50 MG/G
1 PATCH TOPICAL DAILY
Qty: 7 PATCH | Refills: 0 | Status: SHIPPED | OUTPATIENT
Start: 2018-07-04 | End: 2018-07-04

## 2018-07-04 RX ORDER — DIAZEPAM 5 MG/ML
5 INJECTION, SOLUTION INTRAMUSCULAR; INTRAVENOUS ONCE
Status: COMPLETED | OUTPATIENT
Start: 2018-07-04 | End: 2018-07-04

## 2018-07-04 RX ORDER — LIDOCAINE 50 MG/G
1 PATCH TOPICAL ONCE
Status: DISCONTINUED | OUTPATIENT
Start: 2018-07-04 | End: 2018-07-04 | Stop reason: HOSPADM

## 2018-07-04 RX ORDER — NAPROXEN 375 MG/1
375 TABLET ORAL 2 TIMES DAILY WITH MEALS
Qty: 14 TABLET | Refills: 0 | Status: SHIPPED | OUTPATIENT
Start: 2018-07-04 | End: 2018-07-11

## 2018-07-04 RX ORDER — KETOROLAC TROMETHAMINE 30 MG/ML
30 INJECTION, SOLUTION INTRAMUSCULAR; INTRAVENOUS ONCE
Status: COMPLETED | OUTPATIENT
Start: 2018-07-04 | End: 2018-07-04

## 2018-07-04 RX ORDER — DEXAMETHASONE SODIUM PHOSPHATE 10 MG/ML
10 INJECTION, SOLUTION INTRAMUSCULAR; INTRAVENOUS ONCE
Status: COMPLETED | OUTPATIENT
Start: 2018-07-04 | End: 2018-07-04

## 2018-07-04 RX ORDER — DIAZEPAM 5 MG/1
5 TABLET ORAL 2 TIMES DAILY
Qty: 20 TABLET | Refills: 0 | Status: SHIPPED | OUTPATIENT
Start: 2018-07-04 | End: 2018-07-14

## 2018-07-04 RX ORDER — METHYLPREDNISOLONE 4 MG/1
TABLET ORAL
Qty: 21 TABLET | Refills: 0 | Status: SHIPPED | OUTPATIENT
Start: 2018-07-04

## 2018-07-04 RX ORDER — METHOCARBAMOL 500 MG/1
500 TABLET, FILM COATED ORAL ONCE
Status: COMPLETED | OUTPATIENT
Start: 2018-07-04 | End: 2018-07-04

## 2018-07-04 RX ADMIN — KETOROLAC TROMETHAMINE 30 MG: 30 INJECTION, SOLUTION INTRAMUSCULAR at 22:19

## 2018-07-04 RX ADMIN — METHOCARBAMOL 500 MG: 500 TABLET ORAL at 07:26

## 2018-07-04 RX ADMIN — Medication 5 MG: at 22:19

## 2018-07-04 RX ADMIN — DEXAMETHASONE SODIUM PHOSPHATE 10 MG: 10 INJECTION, SOLUTION INTRAMUSCULAR; INTRAVENOUS at 22:19

## 2018-07-04 RX ADMIN — LIDOCAINE 1 PATCH: 50 PATCH TOPICAL at 07:26

## 2018-07-04 RX ADMIN — KETOROLAC TROMETHAMINE 30 MG: 30 INJECTION, SOLUTION INTRAMUSCULAR at 07:26

## 2018-07-04 NOTE — ED PROVIDER NOTES
History  Chief Complaint   Patient presents with    Back Pain     Lower back pain radiating down left leg for 2 weeks     Patient is a healthy 64-year-old female coming in today with 2 weeks of intermittent now progressing to constant lower back pain with radiculopathy into the left lower extremity  Patient states she was in a boot to her left lower leg after a fall and fracture to her left tibia/fibula  She did not have a re-injury to her leg or to her back  She did not fall or have trauma  She reports that her back was intermittent initially and has become more constant  She feels the nerve going down into her leg  She has no difficulty walking  She feels that sitting in better position than lying down  She has had no fevers, chills, or use of IV drugs  She has no past medical history and no on any anticoagulation  She denies any urinary tension, loss of bowel or bladder or saddle anesthesia    She has no weakness or paresthesias throughout the left lower extremity or right lower extremity        History provided by:  Patient and spouse  Back Pain   Location:  Lumbar spine  Quality:  Aching and cramping  Radiates to:  L posterior upper leg  Pain severity:  Mild  Onset quality:  Gradual  Timing:  Constant  Progression:  Unchanged  Chronicity:  New  Context: not emotional stress, not falling, not jumping from heights, not lifting heavy objects, not MCA, not MVA, not occupational injury, not pedestrian accident, not physical stress, not recent illness, not recent injury and not twisting    Relieved by:  Nothing  Worsened by:  Lying down  Ineffective treatments:  Being still, bed rest, ibuprofen, OTC medications, NSAIDs and lying down  Associated symptoms: no abdominal pain, no abdominal swelling, no bladder incontinence, no bowel incontinence, no chest pain, no dysuria, no fever, no headaches, no leg pain, no numbness, no paresthesias, no pelvic pain, no perianal numbness, no tingling, no weakness and no weight loss    Risk factors: no hx of cancer, no hx of osteoporosis, no lack of exercise, no menopause, not obese, not pregnant, no recent surgery, no steroid use and no vascular disease        Prior to Admission Medications   Prescriptions Last Dose Informant Patient Reported? Taking? IRON PO  Self Yes No   Sig: Take by mouth   Multiple Vitamin (MULTIVITAMIN) tablet  Self Yes No   Sig: Take 1 tablet by mouth daily   ibuprofen (MOTRIN) 200 mg tablet  Self Yes No   Sig: Take by mouth every 6 (six) hours as needed for mild pain      Facility-Administered Medications: None       History reviewed  No pertinent past medical history  History reviewed  No pertinent surgical history  Family History   Problem Relation Age of Onset    Atrial fibrillation Mother     Multiple sclerosis Father      I have reviewed and agree with the history as documented  Social History   Substance Use Topics    Smoking status: Never Smoker    Smokeless tobacco: Never Used    Alcohol use No        Review of Systems   Constitutional: Negative for diaphoresis, fever and weight loss  HENT: Negative for ear pain and sore throat  Eyes: Negative for visual disturbance  Respiratory: Negative for chest tightness and shortness of breath  Cardiovascular: Negative for chest pain and palpitations  Gastrointestinal: Negative for abdominal pain, bowel incontinence, nausea and vomiting  Genitourinary: Negative for bladder incontinence, difficulty urinating, dysuria and pelvic pain  Musculoskeletal: Positive for back pain  Negative for neck pain  Skin: Negative for rash  Neurological: Negative for tingling, weakness, numbness, headaches and paresthesias  Psychiatric/Behavioral: Negative for confusion  All other systems reviewed and are negative  Physical Exam  Physical Exam   Constitutional: She is oriented to person, place, and time  She appears well-developed and well-nourished  No distress     HENT:   Head: Normocephalic and atraumatic  Mouth/Throat: Oropharynx is clear and moist    Eyes: Conjunctivae and EOM are normal  Pupils are equal, round, and reactive to light  Neck: Normal range of motion  Neck supple  Cardiovascular: Normal rate, regular rhythm, normal heart sounds and intact distal pulses  No murmur heard  Pulmonary/Chest: Effort normal and breath sounds normal  No stridor  No respiratory distress  Abdominal: Soft  Bowel sounds are normal  She exhibits no distension  There is no tenderness  Musculoskeletal: Normal range of motion  She exhibits no edema  Lumbar back: She exhibits normal range of motion, no tenderness, no bony tenderness, no swelling, no edema, no deformity, no laceration, no pain, no spasm and normal pulse  Back:    Positive straight leg raise on the left as 45°  Negative straight leg raise on the right  Neurological: She is alert and oriented to person, place, and time  She has normal strength  She displays no atrophy and no tremor  No cranial nerve deficit or sensory deficit  She exhibits normal muscle tone  She displays no seizure activity  Gait normal    Reflex Scores:       Patellar reflexes are 2+ on the right side and 2+ on the left side  Achilles reflexes are 2+ on the right side and 2+ on the left side  Skin: Skin is warm  Capillary refill takes less than 2 seconds  She is not diaphoretic  Nursing note and vitals reviewed        Vital Signs  ED Triage Vitals [07/04/18 0658]   Temperature Pulse Respirations Blood Pressure SpO2   98 1 °F (36 7 °C) 85 18 150/82 98 %      Temp Source Heart Rate Source Patient Position - Orthostatic VS BP Location FiO2 (%)   Temporal Monitor Sitting Left arm --      Pain Score       Worst Possible Pain           Vitals:    07/04/18 0658   BP: 150/82   Pulse: 85   Patient Position - Orthostatic VS: Sitting       Visual Acuity      ED Medications  Medications   lidocaine (LIDODERM) 5 % patch 1 patch (1 patch Transdermal Medication Applied 7/4/18 0726)   ketorolac (TORADOL) injection 30 mg (30 mg Intramuscular Given 7/4/18 0726)   methocarbamol (ROBAXIN) tablet 500 mg (500 mg Oral Given 7/4/18 0726)       Diagnostic Studies  Results Reviewed     Procedure Component Value Units Date/Time    POCT pregnancy, urine [[id number]]     Lab Status:  No result     UA w Reflex to Microscopic [[id number]]     Lab Status:  No result Specimen:  Urine                  No orders to display              Procedures  Procedures       Phone Contacts  ED Phone Contact    ED Course                               MDM  Number of Diagnoses or Management Options  Lumbar radiculopathy:   Lumbar sprain, initial encounter:   Diagnosis management comments: Patient is a healthy 68-year-old female coming in with lumbar radiculopathy  Discussed with patient and  at length regarding heat prior to exercises and showed several exercises the room such as single needed chest, double needed chest, pelvic tilt, hamstring stretch, piriformis stretch Excedrin  Will refer to PCP and Ortho  Will provide analgesia with lidocaine, Robaxin as well as Naprosyn  Discussed with patient no NSAIDs along with Naprosyn  Also use of PPI  with NSAIDs  Patient is nontoxic appearing and neurologically intact nonfocal  Patient has no spinal tenderness and meets no criteria at this time for x-rays    DDx including but not limited to: sciatica, herniated disc, arthritis, spinal stenosis, strain, sprain, fracture, cauda equina syndrome, epidural abscess, AAA  CritCare Time    Disposition  Final diagnoses:   Lumbar radiculopathy   Lumbar sprain, initial encounter     Time reflects when diagnosis was documented in both MDM as applicable and the Disposition within this note     Time User Action Codes Description Comment    7/4/2018  7:17 AM Odin Schofield Add [M54 16] Lumbar radiculopathy     7/4/2018  7:17 AM Noe Scott Add Khushi Rose  5XXA] Lumbar sprain, initial encounter ED Disposition     ED Disposition Condition Comment    Discharge  Ada Getting discharge to home/self care  Condition at discharge: Stable        Follow-up Information     Follow up With Specialties Details Why Contact Info    Tony Khan DO Family Medicine Schedule an appointment as soon as possible for a visit in 3 days  [address] [facility] [phone] [facility]      Godfrey Siu MD Orthopedic Surgery Schedule an appointment as soon as possible for a visit in 3 days  [address] [facility] [address]  [phone]            Patient's Medications   Discharge Prescriptions    LIDOCAINE (LIDODERM) 5 %    Place 1 patch on the skin daily for 7 days Remove & Discard patch within 12 hours or as directed by MD       Start Date: 7/4/2018  End Date: 7/11/2018       Order Dose: 1 patch       Quantity: 7 patch    Refills: 0    METHOCARBAMOL (ROBAXIN) 500 MG TABLET    Take 1 tablet (500 mg total) by mouth 3 (three) times a day for 7 days       Start Date: 7/4/2018  End Date: 7/11/2018       Order Dose: 500 mg       Quantity: 21 tablet    Refills: 0    NAPROXEN (NAPROSYN) 375 MG TABLET    Take 1 tablet (375 mg total) by mouth 2 (two) times a day with meals for 7 days       Start Date: 7/4/2018  End Date: 7/11/2018       Order Dose: 375 mg       Quantity: 14 tablet    Refills: 0     No discharge procedures on file      ED Provider  Electronically Signed by           Vila Bosworth, DO  07/04/18 0442

## 2018-07-04 NOTE — DISCHARGE INSTRUCTIONS
BACK EXERCISES  Back exercises help treat and prevent back injuries  The goal of back exercises is to increase the strength of your abdominal and back muscles and the flexibility of your back  These exercises should be started when you no longer have back pain  Back exercises include:Pelvic Tilt  Lie on your back with your knees bent  Tilt your pelvis until the lower part of your back is against the floor  Hold this position 5 to 10 sec and repeat 5 to 10 times  Knee to Chest  Pull first 1 knee up against your chest and hold for 20 to 30 seconds, repeat this with the other knee, and then both knees  This may be done with the other leg straight or bent, whichever feels better  Sit-Ups or Curl-Ups  Bend your knees 90 degrees  Start with tilting your pelvis, and do a partial, slow sit-up, lifting your trunk only 30 to 45 degrees off the floor  Take at least 2 to 3 seconds for each sit-up  Do not do sit-ups with your knees out straight  If partial sit-ups are difficult, simply do the above but with only tightening your abdominal muscles and holding it as directed  Hip-Lift  Lie on your back with your knees flexed 90 degrees  Push down with your feet and shoulders as you raise your hips a couple inches off the floor; hold for 10 seconds, repeat 5 to 10 times  Back arches  Lie on your stomach, propping yourself up on bent elbows  Slowly press on your hands, causing an arch in your low back  Repeat 3 to 5 times  Any initial stiffness and discomfort should lessen with repetition over time  Shoulder-Lifts  Lie face down with arms beside your body  Keep hips and torso pressed to floor as you slowly lift your head and shoulders off the floor  Do not overdo your exercises, especially in the beginning  Exercises may cause you some mild back discomfort which lasts for a few minutes; however, if the pain is more severe, or lasts for more than 15 minutes, do not continue exercises until you see your caregiver   Improvement with exercise therapy for back problems is slow  See your caregivers for assistance with developing a proper back exercise program       Do not take Motrin or ibuprofen while taking naproxen  Use heat before exercise and ice after  Take an over-the-counter PPI such as Pepcid all taken Motrin  Lumbar Radiculopathy   WHAT YOU NEED TO KNOW:   Lumbar radiculopathy is a painful condition that happens when a nerve in your lumbar spine (lower back) is pinched or irritated  Nerves control feeling and movement in your body  You may have numbness or pain that shoots down from your lower back towards your foot  DISCHARGE INSTRUCTIONS:   Medicines:   · Medicines:     ¨ NSAIDs , such as ibuprofen, help decrease swelling, pain, and fever  This medicine is available with or without a doctor's order  NSAIDs can cause stomach bleeding or kidney problems in certain people  If you take blood thinner medicine, always ask your healthcare provider if NSAIDs are safe for you  Always read the medicine label and follow directions  ¨ Muscle relaxers  help decrease pain and muscle spasms  ¨ Opioids: This is a strong medicine given to reduce severe pain  It is also called narcotic pain medicine  Take this medicine exactly as directed by your healthcare provider  ¨ Oral steroids: Steroids may also be given to reduce pain and swelling  ¨ Take your medicine as directed  Contact your healthcare provider if you think your medicine is not helping or if you have side effects  Tell him of her if you are allergic to any medicine  Keep a list of the medicines, vitamins, and herbs you take  Include the amounts, and when and why you take them  Bring the list or the pill bottles to follow-up visits  Carry your medicine list with you in case of an emergency      Follow up with your healthcare provider or spine specialist within 1 to 3 weeks:  After your first follow-up appointment, return to your healthcare provider or spine specialist every 2 weeks until you have healed  Ask for information about physical therapy for your condition  Write down your questions so you remember to ask them during your visits  Physical therapy:  You may need physical therapy to improve your condition  Your physical therapist may teach you certain exercises to improve posture (the way you stand and sit), flexibility, and strength in your lower back  Self care:   · Stay active: It is best to be active when you have lumbar radiculopathy  Your physical therapist or healthcare provider may tell you to take walks to ease yourself back into your daily routine  Avoid long periods of bed rest  Bed rest could worsen your symptoms  Do not move in ways that increase your pain  Ask for more information about the best ways to stay active  · Use ice or heat packs:  Use ice or heat packs as directed on the sore area of your body to decrease the pain and swelling  Put ice in a plastic bag covered with a towel on your low back  Cover heated items with a towel to avoid burns  Use ice and heat as directed  · Avoid heavy lifting: Your condition may worsen if you lift heavy things  Avoid lifting if possible  · Maintain a healthy weight:  Excess body weight may strain your back  Talk with your healthcare provider about ways to lose excess weight if you are overweight  Contact your healthcare provider or spine specialist if:   · Your pain does not improve within 1 to 3 weeks after treatment  · Your pain and weakness keep you from your normal activities at work, home, or school  · You lose more than 10 pounds in 6 months without trying  · You become depressed or sad because of the pain  · You have questions or concerns about your condition or care  Return to the emergency department if:   · You have a fever greater than 100 4°F for longer than 2 days  · You have new, severe back or leg pain, or your pain spreads to both legs      · You have any new signs of numbness or weakness, especially in your lower back, legs, arms, or genital area  · You have new trouble controlling your urine and bowel movements  · You do not feel like your bladder empties when you urinate  © 2017 [employer] Information is for End User's use only and may not be sold, redistributed or otherwise used for commercial purposes  All illustrations and images included in CareNotes® are the copyrighted property of [employer]  or [employer]  The above information is an  only  It is not intended as medical advice for individual conditions or treatments  Talk to your doctor, nurse or pharmacist before following any medical regimen to see if it is safe and effective for you  Lumbar Radiculopathy   WHAT YOU NEED TO KNOW:   What is lumbar radiculopathy? Lumbar radiculopathy is a painful condition that happens when a nerve in your lumbar spine (lower back) is pinched or irritated  Nerves control feeling and movement in your body  What causes lumbar radiculopathy? You may get a pinched nerve in your lumbar spine if you have disc damage  Discs are natural, spongy cushions between your vertebrae (back bones) that allow your spine to move  Your discs may move out of place and pinch the nerve in your spine  Your risk for a pinched nerve and lumbar radiculopathy increases if:  · You smoke  · You have diabetes, a spinal infection, or a growth in your spine  · You are overweight  · You are male  · You are elderly  What are the signs and symptoms of lumbar radiculopathy? You may have any of the following:  · Pain that moves from your lower back to your buttocks, groin, and the back of your leg  The pain is often felt below your knee  Your pain may worsen when you cough, sneeze, stand, or sit  · Numbness, weakness, or tingling in your back or legs  How is lumbar radiculopathy diagnosed?   Your healthcare provider will examine you and ask about your family history of back and leg pain  He may also test you for weakness, numbness, or tingling in your back, buttocks, and legs  Your healthcare provider may ask you to lie on your back and lift your leg to locate your pain  You may have any of the following:  · Blood tests: You may need blood taken to give caregivers information about how your body is working  The blood may be taken from your hand, arm, or IV  · Magnetic resonance imaging (MRI): An MRI machine is used to take a picture of your lower back  Your healthcare provider will use this picture to check for problems and changes in your back bones, nerves, and discs  You will need to lie still during this test  The MRI machine contains a very powerful magnet  Never enter the MRI room with any metal objects  This can cause serious injury  Tell your healthcare provider if you have any metal implants in your body  · X-ray: An x-ray is a picture of your lower back  A lumbar x-ray may show signs of infection or other problems with your spine  · An electromyography (EMG)  test measures the electrical activity of your muscles at rest and with movement  · Computed tomography (CT) scan: A special x-ray machine uses a computer to take pictures of your lower back  It may be used to look at your bones, discs, and nerves  You may be given dye in your IV to help improve the pictures  Tell your healthcare provider if you are allergic to shellfish, or have other allergies or medical conditions  People who are allergic to iodine or shellfish (lobster, crab, or shrimp) may be allergic to some dyes  How is lumbar radiculopathy treated? Treatment of lumbar radiculopathy may reduce pain and swelling, and improve your ability to walk and do your normal activities  Ask your healthcare provider for more information about these and other treatments for lumbar radiculopathy:  · Medicines:     ¨ NSAIDs , such as ibuprofen, help decrease swelling, pain, and fever   This medicine is available with or without a doctor's order  NSAIDs can cause stomach bleeding or kidney problems in certain people  If you take blood thinner medicine, always ask your healthcare provider if NSAIDs are safe for you  Always read the medicine label and follow directions  ¨ Muscle relaxers  help decrease pain and muscle spasms  ¨ Opioids: This is a strong medicine given to reduce severe pain  It is also called narcotic pain medicine  Take this medicine exactly as directed by your healthcare provider  ¨ Oral steroids: Steroids may be given to reduce swelling and pain  ¨ Steroid injections: Healthcare providers may give you steroid medicine through a needle (shot) into your lumbar spine  This may help decrease your nerve pain and swelling  You may need more than 1 injection if your symptoms do not improve after the first treatment  · Physical therapy: Your healthcare provider may suggest physical therapy  Your physical therapist may teach you certain exercises to improve posture (the way you stand and sit), flexibility, and strength in your lower back  He may also teach you how to remain safely active and avoid further injury  Follow the exercise plan given to you by your physical therapist     · Transcutaneous electrical nerve stimulation: This treatment, called TENS, stimulates your nerves and may decrease your pain  Wires are attached to pads  The pads are attached to your skin  The wires send a mild current through your nerves  · Surgery: You may need surgery to relieve your pinched nerve if your condition has not improved within 4 to 6 weeks  You may also need it if you have lumbar radiculopathy more than once  What are the risks of treatment for lumbar radiculopathy? · Without treatment, your pain may worsen  The pinched and swollen nerve may lead to problems when you walk or move  In severe cases, you may lose control of your urine or bowel movements   Bedrest can make your symptoms worse  · Pain medicines can cause vomiting, upset stomach, constipation (large, hard bowel movements that are difficult to pass), or kidney or liver problems  Opioid medicine may be addictive (hard to quit taking once you start)  Oral steroids and steroid injections may have side effects, such as facial redness, fluid retention (water weight gain), and mood changes  Steroid injections may be painful and can cause severe headaches, infections, allergic reactions, or nerve damage  Surgery risks include delayed problems with healing, spinal or bladder infection, damage to the spinal cord or other nerves, and ongoing pain  In rare cases, you could become paralyzed (not able to move your arms or legs)  How can I care for myself when I have lumbar radiculopathy? · Stay active: It is best to be active when you have lumbar radiculopathy  Your healthcare provider may tell you to take walks to ease yourself back into your daily routine  Avoid long periods of bed rest  Bed rest could worsen your symptoms  Do not move in ways that increase your pain  Ask your healthcare provider for more information about the best ways to stay active  · Use ice or heat packs:  Use ice or heat packs on the sore area of your body to decrease the pain and swelling  Put ice in a plastic bag covered with a towel on your low back  Cover heated items with a towel to avoid burns  Use ice and heat as directed  · Avoid heavy lifting: Your condition may worsen if you lift heavy things  Avoid lifting if possible  · Maintain a healthy weight:  Excess body weight may strain your back  Talk with your healthcare provider about ways to lose excess weight if you are overweight  When should I contact my healthcare provider? · Your pain does not improve within 1 to 3 weeks after treatment  · Your pain and weakness keep you from your normal activities at work, home, or school       · You lose more than 10 pounds in 6 months without trying  · You become depressed or sad because of the pain  · You have questions or concerns about your condition or care  When should I seek immediate care or call 911? · You have a fever greater than 100 4°F for longer than 2 days  · You have new, severe back or leg pain, or your pain spreads to both legs  · You have any new signs of numbness or weakness, especially in your lower back, legs, arms, or genital area  · You have new trouble controlling your urine and bowel movements  · You do not feel like your bladder empties when you urinate  CARE AGREEMENT:   You have the right to help plan your care  Learn about your health condition and how it may be treated  Discuss treatment options with your caregivers to decide what care you want to receive  You always have the right to refuse treatment  The above information is an  only  It is not intended as medical advice for individual conditions or treatments  Talk to your doctor, nurse or pharmacist before following any medical regimen to see if it is safe and effective for you  © 2017 [employer]  Information is for End User's use only and may not be sold, redistributed or otherwise used for commercial purposes  All illustrations and images included in CareNotes® are the copyrighted property of [employer]  or [employer]

## 2018-07-05 ENCOUNTER — APPOINTMENT (OUTPATIENT)
Dept: PHYSICAL THERAPY | Facility: CLINIC | Age: 43
End: 2018-07-05
Payer: COMMERCIAL

## 2018-07-05 NOTE — DISCHARGE INSTRUCTIONS
Sciatica   WHAT YOU NEED TO KNOW:   What is sciatica? Sciatica is a condition that causes pain along your sciatic nerve  The sciatic nerve runs from your spine through both sides of your buttocks  It then runs down the back of your thigh, into your lower leg and foot  Any place along your sciatic nerve may be compressed, inflamed, irritated, or stretched and cause symptoms  What causes sciatica? Sciatica may be related to certain activities, poor posture, and physical or psychological stress  Any of the following may cause or increase your risk of sciatica:  · Disc problems:  A slipped disc (soft cushion in between the bones of the spine) is the most common cause of sciatica  The disc may press on the sciatic nerve  One bone in your spine may slip over another, or you may have narrowing of the spinal column  · Muscle injury: This may happen after you twist or lift a heavy object  Swelling from sprained or irritated muscles in the buttocks, thighs, or legs press on the sciatic nerve  · Obesity or pregnancy:  Extra weight increases pressure on your back and legs  · Trauma:  Direct blows on the buttocks, thighs, or legs, car accidents, or falls may injure the sciatic nerve  · Diseases of the spine:  Arthritis, osteoporosis, cancer, or infection of the spine may also affect the sciatic nerve  What are the signs and symptoms of sciatica? The symptoms of sciatic may be short-term or long-term:  · Pain that goes from the lower back into your buttocks and down the back of your thigh    · Numbness or tingling in your buttocks and legs    · Muscle weakness, difficulty moving or controlling your leg or foot    · Leg pain that increases with standing, sitting, or squatting  How is sciatica diagnosed? Your healthcare provider will ask about other health conditions you may have  He may ask you about your job, history of back pain, diseases, or surgeries you have had   He will examine you and move your legs to see what increases pain  You may also need any of the following:  · X-rays: This is a picture of the bones and tissues in your back, hip, thigh, or leg  This test may show other problems, such as fractures (broken bones)  · CT scan: This test is also called a CAT scan  An x-ray machine uses a computer to take pictures of your hips, thighs, and legs  The pictures may show your sciatic nerve, muscles, and blood vessels  You may be given a dye before the pictures are taken to help healthcare providers see the pictures better  Tell the healthcare provider if you have ever had an allergic reaction to contrast dye  · MRI:  This scan uses powerful magnets and a computer to take pictures of your hips, thighs, and legs  An MRI may show damaged nerves, muscles, bones, and blood vessels  You may be given dye to help the pictures show up better  Tell the healthcare provider if you have ever had an allergic reaction to contrast dye  Do not enter the MRI room with anything metal  Metal can cause serious injury  Tell the healthcare provider if you have any metal in or on your body  · An electromyography (EMG)  test measures the electrical activity of your muscles at rest and with movement  · Nerve conduction tests: These tests check how surface nerves and related muscles respond to stimulation  Electrodes with wires or tiny needles are placed on certain areas, such as the buttocks and legs  How is sciatica treated? · NSAIDs:  These medicines decrease swelling and pain  NSAIDs are available without a doctor's order  Ask your healthcare provider which medicine is right for you  Ask how much to take and when to take it  Take as directed  NSAIDs can cause stomach bleeding or kidney problems if not taken correctly  · Acetaminophen: This medicine decreases pain  Acetaminophen is available without a doctor's order  Ask how much to take and how often to take it  Follow directions   Acetaminophen can cause liver damage if not taken correctly  · Muscle relaxers  help decrease pain and muscle spasms  · Epidural steroid medicine: This may include both an anesthetic (numbing medicine) and a steroid, which may decrease swelling and relieve pain  It is given as a shot close to the spine in the area where you have pain  · Chemonucleolysis: This is an injection given into the damaged disc to soften or shrink the disc  · Surgery: This may be done to correct problems such as a damaged disc, or a tumor in your spine  It may be done to decrease the pressure on the sciatic nerve  Healthcare providers may also release the muscle that may be pressing into your sciatic nerve  How can I help manage sciatica? · Ultrasound therapy: This is a machine that uses sound waves to decrease pain  Topical medicines may be added to help decrease pain and inflammation  · Physical therapy:  A physical therapist teaches you exercises to help improve movement and strength, and to decrease pain  An occupational therapist teaches you skills to help with your daily activities  · Assistive devices: You may need to wear back support, such as a back brace  You may need crutches, a cane, or a walker to decrease stress on your lower back and leg muscles  Ask your healthcare provider for more information about assistive devices and how to use them correctly  How can sciatica be prevented? · Avoid pressure on your back and legs:  Do not  lift heavy objects, or stand or sit for long periods of time  · Lift objects safely:  Keep your back straight and bend your knees when you  an object  Do not bend or twist your back when you lift  · Maintain a healthy weight:  Ask your healthcare provider how much you should weigh  Ask him to help you create a weight loss plan if you are overweight  · Exercise:  Ask your healthcare provider about the best stretching, warmup, and exercise plan for you    What are the risks of sciatica? An epidural steroid injection can lead to pain disorders or paralysis if it is placed incorrectly  It may also cause headaches, leg pain, and blockage of blood flow to the spinal cord  Surgery may cause you to bleed or get an infection  If not treated, your muscles and nerves may become damaged permanently  You may have decreased strength  You may not be able to move your leg or control when you urinate or have bowel movements  When should I contact my healthcare provider? · You have pain in your lower back at night or when resting  · You have pain in your lower back with numbness below the knee  · You have weakness in one leg only  · You have questions or concerns about your condition or care  When should I seek immediate care or call 911? · You have trouble holding back your urine or bowel movements  · You have weakness in both legs  · You have numbness in your groin or buttocks  CARE AGREEMENT:   You have the right to help plan your care  Learn about your health condition and how it may be treated  Discuss treatment options with your caregivers to decide what care you want to receive  You always have the right to refuse treatment  The above information is an  only  It is not intended as medical advice for individual conditions or treatments  Talk to your doctor, nurse or pharmacist before following any medical regimen to see if it is safe and effective for you  © 2017 [employer] Information is for End User's use only and may not be sold, redistributed or otherwise used for commercial purposes  All illustrations and images included in CareNotes® are the copyrighted property of [employer]  or [employer]

## 2018-07-05 NOTE — ED PROVIDER NOTES
Pt Name: Jeffrey Nguyen  MRN: [id number]  [place] 1975  Age/Sex: 37 y o  female  Date of evaluation: 7/4/2018  PCP: Maryanne Walden, [address] [place] [address]    Chief Complaint   Patient presents with    Back Pain     Patient was seen earlier for the same thing but stated pain is worse,          HPI    Tawni Mohs presents to the Emergency Department complaining of severe pain in left buttock  She is having more pain  She was here earlier and is taking her prescribed meds  She did heat/ ice/ TENS unit  HPI      Past Medical and Surgical History    History reviewed  No pertinent past medical history  History reviewed  No pertinent surgical history  Family History   Problem Relation Age of Onset    Atrial fibrillation Mother     Multiple sclerosis Father        Social History   Substance Use Topics    Smoking status: Never Smoker    Smokeless tobacco: Never Used    Alcohol use No              Allergies    No Known Allergies    Home Medications    Prior to Admission medications    Medication Sig Start Date End Date Taking?  Authorizing Provider   ibuprofen (MOTRIN) 200 mg tablet Take by mouth every 6 (six) hours as needed for mild pain    Historical Provider, MD   IRON PO Take by mouth    Historical Provider, MD   lidocaine (LIDODERM) 5 % Place 1 patch on the skin daily for 7 days Remove & Discard patch within 12 hours or as directed by MD 7/4/18 7/11/18  Krzysztof Scott,    methocarbamol (ROBAXIN) 500 mg tablet Take 1 tablet (500 mg total) by mouth 3 (three) times a day for 7 days 7/4/18 7/11/18  Krzysztof Scott DO   Multiple Vitamin (MULTIVITAMIN) tablet Take 1 tablet by mouth daily    Historical Provider, MD   naproxen (NAPROSYN) 375 mg tablet Take 1 tablet (375 mg total) by mouth 2 (two) times a day with meals for 7 days 7/4/18 7/11/18  Janna Scott DO   lidocaine (LIDODERM) 5 % Place 1 patch on the skin daily for 7 days Remove & Discard patch within 12 hours or as directed by MD 7/4/18 7/4/18  Michaela Castañeda DO           Review of Systems    Review of Systems   Constitutional: Negative for activity change, appetite change, chills, diaphoresis, fatigue and fever  HENT: Negative for congestion, postnasal drip, rhinorrhea, sinus pressure, sneezing and sore throat  Eyes: Negative for pain and visual disturbance  Respiratory: Negative for cough, chest tightness and shortness of breath  Cardiovascular: Negative for chest pain, palpitations and leg swelling  Gastrointestinal: Negative for abdominal distention, abdominal pain, constipation, diarrhea, nausea and vomiting  Endocrine: Negative for polydipsia, polyphagia and polyuria  Genitourinary: Negative for decreased urine volume, difficulty urinating, dysuria, flank pain, frequency and hematuria  Musculoskeletal: Negative for arthralgias, gait problem, joint swelling and neck pain  Skin: Negative for pallor and rash  Allergic/Immunologic: Negative for immunocompromised state  Neurological: Negative for syncope, speech difficulty, weakness, light-headedness, numbness and headaches  All other systems reviewed and are negative  All other systems reviewed and negative  Physical Exam      ED Triage Vitals [07/04/18 2155]   Temperature Pulse Respirations Blood Pressure SpO2   99 2 °F (37 3 °C) 84 18 169/80 96 %      Temp Source Heart Rate Source Patient Position - Orthostatic VS BP Location FiO2 (%)   Temporal Monitor Lying Right arm --      Pain Score       Worst Possible Pain               Physical Exam   Constitutional: She is oriented to person, place, and time  She appears well-developed and well-nourished  No distress  HENT:   Head: Normocephalic and atraumatic  Nose: Nose normal    Mouth/Throat: Oropharynx is clear and moist    Eyes: Conjunctivae, EOM and lids are normal  Pupils are equal, round, and reactive to light  Neck: Normal range of motion  Neck supple     Cardiovascular: Normal rate, regular rhythm and normal heart sounds  Exam reveals no gallop and no friction rub  No murmur heard  Pulmonary/Chest: Effort normal and breath sounds normal  No accessory muscle usage  No respiratory distress  She has no wheezes  She has no rales  Abdominal: Soft  She exhibits no distension  There is no tenderness  There is no rebound and no guarding  Musculoskeletal:        Back:    Neurological: She is alert and oriented to person, place, and time  No cranial nerve deficit or sensory deficit  Skin: Skin is warm and dry  No rash noted  She is not diaphoretic  No erythema  Psychiatric: She has a normal mood and affect  Her speech is normal and behavior is normal  Judgment and thought content normal    Nursing note and vitals reviewed  Assessment and Plan      MDM  Number of Diagnoses or Management Options  Sciatica:   Diagnosis management comments: Patient with acute non-traumatic lumbar back pain without neurological deficit  No indications for emergency imaging at this time  Will treat conservatively and have patient follow up with primary care provided for re-evaluation  Patient has been instructed to return to the Emergency Department with any acute changes in condition  No midline tenderness  No bowel or bladder incontinence or retention  Diagnostic Results    EKG reviewed and interpreted independently  Labs:    No results found for this or any previous visit      All labs reviewed and utilized in the medical decision making process    Radiology:    No orders to display       All radiology studies independently viewed by me and interpreted by the radiologist     Procedure    Procedures    CritCare Time      ED Course of Care and Re-Assessments      Patient was feeling slightly better after meds    Medications   dexamethasone (PF) (DECADRON) injection 10 mg (10 mg Intramuscular Given 7/4/18 2219)   diazepam (VALIUM) injection 5 mg (5 mg Intramuscular Given 7/4/18 2219)   ketorolac (TORADOL) injection 30 mg (30 mg Intramuscular Given 7/4/18 9943)           FINAL IMPRESSION    Final diagnoses:   Sciatica         DISPOSITION/PLAN    Time reflects when diagnosis was documented in both MDM as applicable and the Disposition within this note     Time User Action Codes Description Comment    7/4/2018 10:56 PM Iker Fragoso Add [M54 30] Sciatica       ED Disposition     ED Disposition Condition Comment    Discharge  Eliezer Buckley discharge to home/self care       Condition at discharge: Good        Follow-up Information     Follow up With Specialties Details Why Contact Info    Osbaldo Schuster DO Family Medicine Schedule an appointment as soon as possible for a visit  [address]  [facility] [address]  [phone]              PATIENT REFERRED TO:    Osbaldo Schuster, [address] [address]  [facility] [address]  [phone]    Schedule an appointment as soon as possible for a visit        DISCHARGE MEDICATIONS:    Discharge Medication List as of 7/4/2018 10:58 PM      START taking these medications    Details   diazepam (VALIUM) 5 mg tablet Take 1 tablet (5 mg total) by mouth 2 (two) times a day for 10 days, Starting Wed 7/4/2018, Until Sat 7/14/2018, Normal      Methylprednisolone 4 MG TBPK Use as directed on package, Normal         CONTINUE these medications which have NOT CHANGED    Details   ibuprofen (MOTRIN) 200 mg tablet Take by mouth every 6 (six) hours as needed for mild pain, Historical Med      lidocaine (LIDODERM) 5 % Place 1 patch on the skin daily for 7 days Remove & Discard patch within 12 hours or as directed by MD, Starting Wed 7/4/2018, Until Wed 7/11/2018, Print      methocarbamol (ROBAXIN) 500 mg tablet Take 1 tablet (500 mg total) by mouth 3 (three) times a day for 7 days, Starting Wed 7/4/2018, Until Wed 7/11/2018, Print      Multiple Vitamin (MULTIVITAMIN) tablet Take 1 tablet by mouth daily, Historical Med      naproxen (NAPROSYN) 375 mg tablet Take 1 tablet (375 mg total) by mouth 2 (two) times a day with meals for 7 days, Starting Wed 7/4/2018, Until Wed 7/11/2018, Print             No discharge procedures on file           DO Lily Jacobo DO  07/05/18 0103

## 2018-07-10 ENCOUNTER — APPOINTMENT (OUTPATIENT)
Dept: PHYSICAL THERAPY | Facility: CLINIC | Age: 43
End: 2018-07-10
Payer: COMMERCIAL

## 2018-07-11 ENCOUNTER — OFFICE VISIT (OUTPATIENT)
Dept: PHYSICAL THERAPY | Facility: CLINIC | Age: 43
End: 2018-07-11
Payer: COMMERCIAL

## 2018-07-11 DIAGNOSIS — S82.65XD CLOSED NONDISPLACED FRACTURE OF LATERAL MALLEOLUS OF LEFT FIBULA WITH ROUTINE HEALING: Primary | ICD-10-CM

## 2018-07-11 DIAGNOSIS — M25.572 ACUTE LEFT ANKLE PAIN: ICD-10-CM

## 2018-07-11 PROCEDURE — 97140 MANUAL THERAPY 1/> REGIONS: CPT

## 2018-07-11 PROCEDURE — 97112 NEUROMUSCULAR REEDUCATION: CPT

## 2018-07-11 PROCEDURE — 97110 THERAPEUTIC EXERCISES: CPT

## 2018-07-11 NOTE — PROGRESS NOTES
Daily Note     Today's date: 2018  Patient name: Amalai Cameron  : 1975  MRN: [id number]  Referring provider: Wally Enriquez PA-C  Dx:   Encounter Diagnosis     ICD-10-CM    1  Closed nondisplaced fracture of lateral malleolus of left fibula with routine healing S82 [id number]    2  Acute left ankle pain M25 572        Start Time: 0735          Subjective: patient stated she was in the ER twice on  for sciatic pain and was given an injection, pain meds, and pain patch for sx  Patient went to chiropractor the following day to help with pain  Patient stated she had some relief  Patient spent the next couple of days relaxing but found that her ankle became weak and unstable from the inacactivity  Pain in 0/10 left ankle                   Pain in 0/10 sciatic pain       Objective: See treatment diary below    Precautions: WBAT LLE in CAM boot     Specialty Daily Treatment Diary      Manual  7/3 7/11      L foot/ankle PROM and joint mobs 15 min 15 min                                       Exercise Diary  7/3 7/11      [address] [place] [address] L4 10min L4 10 min      TR/HR        Standing SLR 3-way on foam 2x10  1# 2x10  1#      Rocker Board 20x 20x      BAPS Board D/C       Foam Roll D/C       Golf Ball Roll D/C       Ankle 4-way 20x 2# 20x 2#      Ankle MREs        Fwd/Lat Step Ups 6" step  2x10 6" step  2x10      Step Up and Over        Eccentric Step Downs 2x10  4' step 2x10  4" step      SLS on Foam 4x 15" hd 4x 15' hd      Biodex        Bosu March 2 min 2 min      Bosu Lunge 10x 10x       DF Heel slides         Single limb cone tap 10x  bilat 10x   bilat                              Modalities 7/3 7/11      CP to L ankle 15 min 15 min                                   Assessment: No progression with program due to aforementioned subjective comments  Patient able tolerate program well despite c/o of instability  No LOB  with program  Patient challanged with neuromuscular activities    Will continue to monitor pain levels and ankle strength and progress as able      Plan: Continue per plan of care  Progress treatment as tolerated

## 2018-07-12 ENCOUNTER — OFFICE VISIT (OUTPATIENT)
Dept: PHYSICAL THERAPY | Facility: CLINIC | Age: 43
End: 2018-07-12
Payer: COMMERCIAL

## 2018-07-12 DIAGNOSIS — S82.65XD CLOSED NONDISPLACED FRACTURE OF LATERAL MALLEOLUS OF LEFT FIBULA WITH ROUTINE HEALING: Primary | ICD-10-CM

## 2018-07-12 DIAGNOSIS — M54.42 ACUTE MIDLINE LOW BACK PAIN WITH LEFT-SIDED SCIATICA: ICD-10-CM

## 2018-07-12 DIAGNOSIS — M25.572 ACUTE LEFT ANKLE PAIN: ICD-10-CM

## 2018-07-12 PROCEDURE — 97112 NEUROMUSCULAR REEDUCATION: CPT | Performed by: PHYSICAL THERAPIST

## 2018-07-12 PROCEDURE — 97110 THERAPEUTIC EXERCISES: CPT | Performed by: PHYSICAL THERAPIST

## 2018-07-12 PROCEDURE — G8978 MOBILITY CURRENT STATUS: HCPCS | Performed by: PHYSICAL THERAPIST

## 2018-07-12 PROCEDURE — G8979 MOBILITY GOAL STATUS: HCPCS | Performed by: PHYSICAL THERAPIST

## 2018-07-12 PROCEDURE — 97164 PT RE-EVAL EST PLAN CARE: CPT | Performed by: PHYSICAL THERAPIST

## 2018-07-12 PROCEDURE — 97140 MANUAL THERAPY 1/> REGIONS: CPT | Performed by: PHYSICAL THERAPIST

## 2018-07-12 NOTE — LETTER
2018    Marjorie Adair PA-C  [facility] [address] [address]    Patient: Rajeev Barrios   YOB: 1975   Date of Visit: 2018     Encounter Diagnosis     ICD-10-CM    1  Closed nondisplaced fracture of lateral malleolus of left fibula with routine healing S82 65XD    2  Acute left ankle pain M25 572    3  Acute midline low back pain with left-sided sciatica M54 42        Dear Dr Rangel Wang:    Please review the attached Plan of Care from [facility] recent visit  Please verify that you agree therapy should continue by signing the attached document and sending it back to our office  If you have any questions or concerns, please don't hesitate to call  Sincerely,    Leyla Brewster, PT      Referring Provider:      I certify that I have read the below Plan of Care and certify the need for these services furnished under this plan of treatment while under my care  Marjorie Adair PA-C  [facility]  [address] [facility] [address]  [address]          PT Re-Evaluation     Today's date: 2018  Patient name: Rajeev Barrios  : 1975  MRN: [id number]  Referring provider: Mika Frank PA-C  Dx:   Encounter Diagnosis     ICD-10-CM    1  Closed nondisplaced fracture of lateral malleolus of left fibula with routine healing S82 65XD    2  Acute left ankle pain M25 572    3  Acute midline low back pain with left-sided sciatica M54 42        Start Time: 0800  Stop Time: 09  Total time in clinic (min): 90 minutes    Assessment  Impairments: abnormal gait, abnormal or restricted ROM, activity intolerance, impaired balance, impaired physical strength, lacks appropriate home exercise program, pain with function and weight-bearing intolerance    Assessment details: Pt presents s/p fall on 18 resulting in closed non-displaced fracture of lateral malleolus of left fibula, WBAT with L lace up ankle brace   During the course of treatment, pt developed low back pain with sciatica symptoms into the LLE  Pt was seen in ED and by chiropractor, and also received an additional referral to treat current impairments in the low back  PT notes the patient with WFL mm strength and ROM of the L ankle, improved gait mechanics, and improved functional mobility  For the current impairments in the low back, PT notes the patient with decreased BLE mm flexibility, radiating symptoms in to LLE along L5-S1 dermatome, TTP in L posterior gluteal musculature, decreased core strength, and decreased functional mobility  Pt would continue to benefit from skilled PT to treat impairments following L fibular fx on 4/11/18 and development of low back pain with sciatica symptoms in the LLE to ensure return to prior level of function  Pt reports decline in patient reported outcome 2* to development of low back pain during course of treatment  Understanding of Dx/Px/POC: good   Prognosis: good    Goals  STG  1  Pt will demonstrate 0/10 pain with ADLs and functional mobility- Partially met  2  Pt will improve AROM of the L ankle to WNL- MET  3  Initiate HEP with good tolerance from pt- MET    LTG   1  Pt will improve standing static and dynamic balance to WNL- In progress  2  Pt will demonstrate 5/5 t/o BLE- Partially met  3  Pt will be independent with HEP and appropriate for d/c- In progress  4  Pt will improve core strength to [facility]  5   Pt will demonstrate no radiating symptoms into the LLE    Plan  Patient would benefit from: PT eval and skilled physical therapy  Planned modality interventions: cryotherapy, unattended electrical stimulation and thermotherapy: hydrocollator packs  Planned therapy interventions: joint mobilization, massage, manual therapy, neuromuscular re-education, balance, balance/weight bearing training, flexibility, gait training, graded exercise, home exercise program, patient education, postural training, strengthening, stretching, therapeutic exercise and abdominal trunk stabilization  Frequency: 2x week  Duration in visits: 8  Duration in weeks: 4  Treatment plan discussed with: patient  Plan details: Discussed findings of POC with pt, pt agreeable to skilled PT 2x/wk for 4 wks        Subjective Evaluation    History of Present Illness  Date of onset: 2018  Mechanism of injury: Pt presents s/p fall resulting in non-displaced fx of the L lateral malleolus on 18  Pt reports she was going down on the stairs and felt the ankle give out while she was stepping down onto her left foot  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, and has rolled her L ankle several times, but none as serious as this  Pt was originally placed in short cast after injury, but was transitioned to CAM boot at recent f/u appt with MD on   Pt is scheduled to f/u with MD on   Presently, pt has attended 8 sessions of OPPT since initial evaluation, reporting decreased pain at the L ankle, imporved strength and mobility, and is progressing towards established goals  Pt reports on , she developed severe pain in the low back, which required an ED visit and treatment by chiropractor  Pt was given referral for core strengthening to treat current impairments  Pt would like to continue OPPT to ensure return to PLOF     Not a recurrent problem   Quality of life: good    Pain  Current pain ratin  At best pain ratin  At worst pain ratin  Location: Left ankle and low back  Quality: discomfort and pulling  Relieving factors: rest and ice  Aggravating factors: standing and walking  Progression: improved      Diagnostic Tests  X-ray: abnormal  Treatments  No previous or current treatments  Current treatment: chiropractic and physical therapy  Patient Goals  Patient goals for therapy: decreased edema, decreased pain, improved balance, increased motion, increased strength and return to sport/leisure activities        Objective     Palpation     Additional Palpation Details  Pt notes no TTP of b/l LE musculature     Tenderness   Left Ankle/Foot   No tenderness  Right Ankle/Foot   No tenderness  Neurological Testing     Reflexes   Left   Patellar (L4): normal (2+)  Achilles (S1): normal (2+)    Right   Patellar (L4): normal (2+)  Achilles (S1): normal (2+)    Active Range of Motion   Left Ankle/Foot   Dorsiflexion (ke): 20 degrees   Plantar flexion: 50 degrees   Inversion: 30 degrees   Eversion: 20 degrees     Right Ankle/Foot   Dorsiflexion (ke): 20 degrees   Plantar flexion: 50 degrees   Inversion: 30 degrees   Eversion: 25 degrees     Additional Active Range of Motion Details  PT notes the patient with WFL AROM of B/L ankles    Strength/Myotome Testing     Left Hip   Planes of Motion   Flexion: 4  Extension: 4+  Abduction: 4  Adduction: 4    Right Hip   Planes of Motion   Flexion: 4  Extension: 4+  Abduction: 4  Adduction: 4    Left Knee   Flexion: 4+  Extension: 4+    Right Knee   Flexion: 4+  Extension: 4+    Left Ankle/Foot   Dorsiflexion: 4+  Plantar flexion: 4+  Inversion: 4  Eversion: 4    Right Ankle/Foot   Normal strength  Dorsiflexion: 4+  Plantar flexion: 4+  Inversion: 4+  Eversion: 4+    Tests       Thoracic   Negative slump  Lumbar   Positive repeated extension  Negative repeated flexion and slump  Left   Positive crossed SLR  Negative femoral stretch, passive SLR, quadrant, sural bias, tibial bias and vertical compression  Left Pelvic Girdle/Sacrum   Negative: sacrum compression and gapping  Left Knee   Negative peroneal nerve tension  Additional Tests Details  Pt reported tightness in the low back/posterior gluteal region with repeated extension and tingling in L toes  Ambulation   Weight-Bearing Status     Additional Weight-Bearing Status Details  WBAT, no assitive device or CAM boot  Using ankle brace on L ankle for support/stability     Observational Gait   Gait: within functional limits   Walking speed and stride length within functional limits  Left arm swing: within functional limits  Right arm swing: within functional limits  Base of support: normal      Flowsheet Rows      Most Recent Value   PT/OT G-Codes   Current Score  45   Projected Score  64   FOTO information reviewed  Yes   Assessment Type  Re-evaluation   G code set  Mobility: Walking & Moving Around   Mobility: Walking and Moving Around Current Status ()  CK   Mobility: Walking and Moving Around Goal Status ()  CJ        Precautions: WBAT LLE; low back pain     Specialty Daily Treatment Diary      Manual  7/3 7/11  7/12       L foot/ankle PROM and joint mobs 15 min 15 min         BLE PROM      15 min                           Exercise Diary  7/3 7/11  7/12       SRC L4 10min L4 10 min L4 10 min       Standing SLR 3-way on foam 2x10  1# 2x10  1#  2x10  1 5# Bilat        Rocker Board 20x 20x 20x       BAPS Board D/C Monster Walks 6x10 feet  Green        Foam Roll D/C Side Step and Squat 6x10 feet  Green        Ankle 4-way 20x 2# 20x 2# HEP       Fwd/Lat Step Ups 6" step  2x10 6" step  2x10 6" Step  2x10       Step Up and Over             Eccentric Step Downs 2x10  4' step 2x10  4" step 2x10  4" step       SLS on Foam 4x 15" hd 4x 15' hd         Biodex             Bosu March 2 min 2 min 2 min       Bosu Lunge 10x 10x 10x        DF Heel slides              Single limb cone tap 10x  bilat 10x   bilat         PPT     20x5" hold       Bridges   20x     LTR/DKTC with Tball   20x each     Hip Abd/Add        Tball Abd Crunch        Sidelying Bridge                                            Modalities 7/3 7/11  7/12       CP to L ankle 15 min 15 min 15 min and low back

## 2018-07-12 NOTE — PROGRESS NOTES
PT Re-Evaluation     Today's date: 2018  Patient name: Rahel Kapadia  : 1975  MRN: [id number]  Referring provider: Morteza Bailey PA-C  Dx:   Encounter Diagnosis     ICD-10-CM    1  Closed nondisplaced fracture of lateral malleolus of left fibula with routine healing S82 65XD    2  Acute left ankle pain M25 572    3  Acute midline low back pain with left-sided sciatica M54 42        Start Time: 0800  Stop Time: 930  Total time in clinic (min): 90 minutes    Assessment  Impairments: abnormal gait, abnormal or restricted ROM, activity intolerance, impaired balance, impaired physical strength, lacks appropriate home exercise program, pain with function and weight-bearing intolerance    Assessment details: Pt presents s/p fall on 18 resulting in closed non-displaced fracture of lateral malleolus of left fibula, WBAT with L lace up ankle brace  During the course of treatment, pt developed low back pain with sciatica symptoms into the LLE  Pt was seen in ED and by chiropractor, and also received an additional referral to treat current impairments in the low back  PT notes the patient with WFL mm strength and ROM of the L ankle, improved gait mechanics, and improved functional mobility  For the current impairments in the low back, PT notes the patient with decreased BLE mm flexibility, radiating symptoms in to LLE along L5-S1 dermatome, TTP in L posterior gluteal musculature, decreased core strength, and decreased functional mobility  Pt would continue to benefit from skilled PT to treat impairments following L fibular fx on 18 and development of low back pain with sciatica symptoms in the LLE to ensure return to prior level of function  Pt reports decline in patient reported outcome 2* to development of low back pain during course of treatment  Understanding of Dx/Px/POC: good   Prognosis: good    Goals  STG  1  Pt will demonstrate 0/10 pain with ADLs and functional mobility- Partially met  2   Pt will improve AROM of the L ankle to WNL- MET  3  Initiate HEP with good tolerance from pt- MET    LTG   1  Pt will improve standing static and dynamic balance to WNL- In progress  2  Pt will demonstrate 5/5 t/o BLE- Partially met  3  Pt will be independent with HEP and appropriate for d/c- In progress  4  Pt will improve core strength to [facility]  5  Pt will demonstrate no radiating symptoms into the LLE    Plan  Patient would benefit from: PT eval and skilled physical therapy  Planned modality interventions: cryotherapy, unattended electrical stimulation and thermotherapy: hydrocollator packs  Planned therapy interventions: joint mobilization, massage, manual therapy, neuromuscular re-education, balance, balance/weight bearing training, flexibility, gait training, graded exercise, home exercise program, patient education, postural training, strengthening, stretching, therapeutic exercise and abdominal trunk stabilization  Frequency: 2x week  Duration in visits: 8  Duration in weeks: 4  Treatment plan discussed with: patient  Plan details: Discussed findings of POC with pt, pt agreeable to skilled PT 2x/wk for 4 wks        Subjective Evaluation    History of Present Illness  Date of onset: 4/11/2018  Mechanism of injury: Pt presents s/p fall resulting in non-displaced fx of the L lateral malleolus on 4/11/18  Pt reports she was going down on the stairs and felt the ankle give out while she was stepping down onto her left foot  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, and has rolled her L ankle several times, but none as serious as this  Pt was originally placed in short cast after injury, but was transitioned to CAM boot at recent f/u appt with MD on 5/24  Pt is scheduled to f/u with MD on 6/21  Presently, pt has attended 8 sessions of OPPT since initial evaluation, reporting decreased pain at the L ankle, imporved strength and mobility, and is progressing towards established goals   Pt reports on 7/4, she developed severe pain in the low back, which required an ED visit and treatment by chiropractor  Pt was given referral for core strengthening to treat current impairments  Pt would like to continue OPPT to ensure return to PLOF  Not a recurrent problem   Quality of life: good    Pain  Current pain ratin  At best pain ratin  At worst pain ratin  Location: Left ankle and low back  Quality: discomfort and pulling  Relieving factors: rest and ice  Aggravating factors: standing and walking  Progression: improved      Diagnostic Tests  X-ray: abnormal  Treatments  No previous or current treatments  Current treatment: chiropractic and physical therapy  Patient Goals  Patient goals for therapy: decreased edema, decreased pain, improved balance, increased motion, increased strength and return to sport/leisure activities        Objective     Palpation     Additional Palpation Details  Pt notes no TTP of b/l LE musculature     Tenderness   Left Ankle/Foot   No tenderness  Right Ankle/Foot   No tenderness       Neurological Testing     Reflexes   Left   Patellar (L4): normal (2+)  Achilles (S1): normal (2+)    Right   Patellar (L4): normal (2+)  Achilles (S1): normal (2+)    Active Range of Motion   Left Ankle/Foot   Dorsiflexion (ke): 20 degrees   Plantar flexion: 50 degrees   Inversion: 30 degrees   Eversion: 20 degrees     Right Ankle/Foot   Dorsiflexion (ke): 20 degrees   Plantar flexion: 50 degrees   Inversion: 30 degrees   Eversion: 25 degrees     Additional Active Range of Motion Details  PT notes the patient with WFL AROM of B/L ankles    Strength/Myotome Testing     Left Hip   Planes of Motion   Flexion: 4  Extension: 4+  Abduction: 4  Adduction: 4    Right Hip   Planes of Motion   Flexion: 4  Extension: 4+  Abduction: 4  Adduction: 4    Left Knee   Flexion: 4+  Extension: 4+    Right Knee   Flexion: 4+  Extension: 4+    Left Ankle/Foot   Dorsiflexion: 4+  Plantar flexion: 4+  Inversion: 4  Eversion: 4    Right Ankle/Foot   Normal strength  Dorsiflexion: 4+  Plantar flexion: 4+  Inversion: 4+  Eversion: 4+    Tests       Thoracic   Negative slump  Lumbar   Positive repeated extension  Negative repeated flexion and slump  Left   Positive crossed SLR  Negative femoral stretch, passive SLR, quadrant, sural bias, tibial bias and vertical compression  Left Pelvic Girdle/Sacrum   Negative: sacrum compression and gapping  Left Knee   Negative peroneal nerve tension  Additional Tests Details  Pt reported tightness in the low back/posterior gluteal region with repeated extension and tingling in L toes  Ambulation   Weight-Bearing Status     Additional Weight-Bearing Status Details  WBAT, no assitive device or CAM boot  Using ankle brace on L ankle for support/stability     Observational Gait   Gait: within functional limits   Walking speed and stride length within functional limits     Left arm swing: within functional limits  Right arm swing: within functional limits  Base of support: normal      Flowsheet Rows      Most Recent Value   PT/OT G-Codes   Current Score  45   Projected Score  64   FOTO information reviewed  Yes   Assessment Type  Re-evaluation   G code set  Mobility: Walking & Moving Around   Mobility: Walking and Moving Around Current Status ()  CK   Mobility: Walking and Moving Around Goal Status ()  CJ        Precautions: WBAT LLE; low back pain     Specialty Daily Treatment Diary      Manual  7/3 7/11  7/12       L foot/ankle PROM and joint mobs 15 min 15 min         BLE PROM      15 min                           Exercise Diary  7/3 7/11  7/12       SRC L4 10min L4 10 min L4 10 min       Standing SLR 3-way on foam 2x10  1# 2x10  1#  2x10  1 5# Bilat        Rocker Board 20x 20x 20x       BAPS Board D/C Monster Walks 6x10 feet  Green        Foam Roll D/C Side Step and Squat 6x10 feet  Green        Ankle 4-way 20x 2# 20x 2# HEP       Fwd/Lat Step Ups 6" step  2x10 6" step  2x10 6" Step  2x10       Step Up and Over             Eccentric Step Downs 2x10  4' step 2x10  4" step 2x10  4" step       SLS on Foam 4x 15" hd 4x 15' hd         Biodex             Bosu March 2 min 2 min 2 min       Bosu Lunge 10x 10x 10x        DF Heel slides              Single limb cone tap 10x  bilat 10x   bilat         PPT     20x5" hold       Bridges   20x     LTR/DKTC with Tball   20x each     Hip Abd/Add        Tball Abd Crunch        Sidelying Bridge                                            Modalities 7/3 7/11  7/12       CP to L ankle 15 min 15 min 15 min and low back

## 2018-07-17 ENCOUNTER — APPOINTMENT (OUTPATIENT)
Dept: PHYSICAL THERAPY | Facility: CLINIC | Age: 43
End: 2018-07-17
Payer: COMMERCIAL

## 2018-07-19 ENCOUNTER — OFFICE VISIT (OUTPATIENT)
Dept: PHYSICAL THERAPY | Facility: CLINIC | Age: 43
End: 2018-07-19
Payer: COMMERCIAL

## 2018-07-19 DIAGNOSIS — M54.42 ACUTE MIDLINE LOW BACK PAIN WITH LEFT-SIDED SCIATICA: ICD-10-CM

## 2018-07-19 DIAGNOSIS — S82.65XD CLOSED NONDISPLACED FRACTURE OF LATERAL MALLEOLUS OF LEFT FIBULA WITH ROUTINE HEALING: Primary | ICD-10-CM

## 2018-07-19 DIAGNOSIS — M25.572 ACUTE LEFT ANKLE PAIN: ICD-10-CM

## 2018-07-19 PROCEDURE — 97110 THERAPEUTIC EXERCISES: CPT

## 2018-07-19 PROCEDURE — 97140 MANUAL THERAPY 1/> REGIONS: CPT

## 2018-07-19 NOTE — PROGRESS NOTES
Daily Note     Today's date: 2018  Patient name: Jerica Lewis  : 1975  MRN: [id number]  Referring provider: Raul Sparrow PA-C  Dx:   Encounter Diagnosis     ICD-10-CM    1  Closed nondisplaced fracture of lateral malleolus of left fibula with routine healing S82 65XD    2  Acute left ankle pain M25 572    3  Acute midline low back pain with left-sided sciatica M54 42        Start Time: 0800          Subjective: Patient continue to c/o instability in ankle  Patient also is hampered by sciatic pain in left side        Objective: See treatment diary below    Precautions: WBAT LLE; low back pain     Specialty Daily Treatment Diary      Manual  7/3 7/11  7/12  7/19     L foot/ankle PROM and joint mobs 15 min 15 min         BLE PROM      15 min  15 min                         Exercise Diary  7/3 7/11  7/12  7/19     SRC L4 10min L4 10 min L4 10 min  L4 10 min     Standing SLR 3-way on foam 2x10  1# 2x10  1#  2x10  1 5# Bilat   2x10  1 5#     Rocker Board 20x 20x 20x  no hands  20x     BAPS Board D/C Monster Walks 6x10 feet  Green   6x 10 feet  Green     Foam Roll D/C Side Step and Squat 6x10 feet  Green   6x 10 feet   green     Ankle 4-way 20x 2# 20x 2# HEP       Fwd/Lat Step Ups 6" step  2x10 6" step  2x10 6" Step  2x10  6" step  2x10     Step Up and Over             Eccentric Step Downs 2x10  4' step 2x10  4" step 2x10  4" step  2x10  4' step     SLS on Foam 4x 15" hd 4x 15' hd         BOSU step over and kick out        10x bilat     Bosu March 2 min 2 min 2 min  3 min     Bosu Lunge 10x 10x 10x  15x      DF Heel slides              Single limb cone tap 10x  bilat 10x   bilat         PPT     20x5" hold  20x 5" hd     Bridges   20x 20x    LTR/DKTC with Tball   20x each 20x each    Hip Abd/Add        Tball Abd Crunch        Sidelying Bridge                                            Modalities 7/3 7/11  7/12  7/19     CP to L ankle 15 min 15 min 15 min and low back  held                              Assessment: Patient able to progress with repetitions and resistance with various exercises  Good tolerance with progression with minimal cues required  Patient continues to present with deficits with strength and ROM requiring continued skilled physical therapy      Plan: Continue per plan of care  Progress treatment as tolerated

## 2018-07-20 ENCOUNTER — APPOINTMENT (OUTPATIENT)
Dept: PHYSICAL THERAPY | Facility: CLINIC | Age: 43
End: 2018-07-20
Payer: COMMERCIAL

## 2018-07-23 ENCOUNTER — APPOINTMENT (OUTPATIENT)
Dept: PHYSICAL THERAPY | Facility: CLINIC | Age: 43
End: 2018-07-23
Payer: COMMERCIAL

## 2018-07-23 ENCOUNTER — OFFICE VISIT (OUTPATIENT)
Dept: PHYSICAL THERAPY | Facility: CLINIC | Age: 43
End: 2018-07-23
Payer: COMMERCIAL

## 2018-07-23 DIAGNOSIS — M54.42 ACUTE MIDLINE LOW BACK PAIN WITH LEFT-SIDED SCIATICA: ICD-10-CM

## 2018-07-23 DIAGNOSIS — S82.65XD CLOSED NONDISPLACED FRACTURE OF LATERAL MALLEOLUS OF LEFT FIBULA WITH ROUTINE HEALING: Primary | ICD-10-CM

## 2018-07-23 DIAGNOSIS — M25.572 ACUTE LEFT ANKLE PAIN: ICD-10-CM

## 2018-07-23 PROCEDURE — 97110 THERAPEUTIC EXERCISES: CPT

## 2018-07-23 PROCEDURE — 97140 MANUAL THERAPY 1/> REGIONS: CPT

## 2018-07-23 NOTE — PROGRESS NOTES
Daily Note     Today's date: 2018  Patient name: Jeffrey Nguyen  : 1975  MRN: [id number]  Referring provider: Jacklyn Camejo PA-C  Dx:   Encounter Diagnosis     ICD-10-CM    1  Closed nondisplaced fracture of lateral malleolus of left fibula with routine healing S82 65XD    2  Acute left ankle pain M25 572    3   Acute midline low back pain with left-sided sciatica M54 42        Start Time: 0835          Subjective: patient stated that sometimes she has no pain and then there are days where her sciatic is killing her       Objective: See treatment diary below    Precautions: WBAT LLE; low back pain     Specialty Daily Treatment Diary      Manual  7/3 7/11  7/12  7/19  7/23   L foot/ankle PROM and joint mobs 15 min 15 min         BLE PROM      15 min  15 min  15 min                       Exercise Diary  7/3 7/11  7/12  7/19  7/23   SRC L4 10min L4 10 min L4 10 min  L4 10 min  L4 10 min   Standing SLR 3-way on foam 2x10  1# 2x10  1#  2x10  1 5# Bilat   2x10  1 5#  2x10  2#   Rocker Board 20x 20x 20x  no hands  20x     BAPS Board D/C Monster Walks 6x10 feet  Green   6x 10 feet  Green  6x 10 feet  green   Foam Roll D/C Side Step and Squat 6x10 feet  Green   6x 10 feet   green  6x 10 feet  green   Ankle 4-way 20x 2# 20x 2# HEP       Fwd/Lat Step Ups 6" step  2x10 6" step  2x10 6" Step  2x10  6" step  2x10  6" step  2x10   Step Up and Over             Eccentric Step Downs 2x10  4' step 2x10  4" step 2x10  4" step  2x10  4' step  2x10 6' step   SLS on Foam 4x 15" hd 4x 15' hd         BOSU step over and kick out        10x bilat  15x   Bosu March 2 min 2 min 2 min  3 min  3 min   Bosu Lunge 10x 10x 10x  15x      DF Heel slides              Single limb cone tap 10x  bilat 10x   bilat         PPT     20x5" hold  20x 5" hd  20x 5' hd   Bridges   20x 20x 20x   LTR/DKTC with Tball   20x each 2x15 each 2x15 each   Hip Abd/Add        Tball Abd Crunch        Sidelying Bridge                                          Modalities 7/3 7/11  7/12  7/19  7/23   CP to L ankle 15 min 15 min 15 min and low back  held  15 min low back                                      Assessment: Patient progressing well towards goals  Good execution of exercise with program  Patient demonstrated the appropriate amount of fatigue for program   Will continue to monitor pain levels and progress as able      Plan: Continue per plan of care  Progress treatment as tolerated

## 2018-07-26 ENCOUNTER — OFFICE VISIT (OUTPATIENT)
Dept: PHYSICAL THERAPY | Facility: CLINIC | Age: 43
End: 2018-07-26
Payer: COMMERCIAL

## 2018-07-26 DIAGNOSIS — M25.572 ACUTE LEFT ANKLE PAIN: ICD-10-CM

## 2018-07-26 DIAGNOSIS — S82.65XD CLOSED NONDISPLACED FRACTURE OF LATERAL MALLEOLUS OF LEFT FIBULA WITH ROUTINE HEALING: Primary | ICD-10-CM

## 2018-07-26 DIAGNOSIS — M54.42 ACUTE MIDLINE LOW BACK PAIN WITH LEFT-SIDED SCIATICA: ICD-10-CM

## 2018-07-26 PROCEDURE — 97110 THERAPEUTIC EXERCISES: CPT

## 2018-07-26 PROCEDURE — 97140 MANUAL THERAPY 1/> REGIONS: CPT

## 2018-07-26 NOTE — PROGRESS NOTES
Daily Note     Today's date: 2018  Patient name: Martin Anderson  : 1975  MRN: [id number]  Referring provider: Tiburcio Burgess PA-C  Dx:   Encounter Diagnosis     ICD-10-CM    1  Closed nondisplaced fracture of lateral malleolus of left fibula with routine healing S82 65XD    2  Acute left ankle pain M25 572    3   Acute midline low back pain with left-sided sciatica M54 42                   Subjective: " today is my best day ever "                        Pain in 0/10      Objective: See treatment diary below    Precautions: WBAT LLE; low back pain     Specialty Daily Treatment Diary      Manual     L foot/ankle PROM and joint mobs 15 min 15 min         BLE PROM      15 min  15 min  15 min                       Exercise Diary     SRC L4 10min L4 10 min L4 10 min  L4 10 min  L4 10 min   Standing SLR 3-way on foam 2x10  2# 2x10  1#  2x10  1 5# Bilat   2x10  1 5#  2x10  2#   Rocker Board  20x 20x  no hands  20x     Monster walk 6x 10 feet  blue Monster Walks 6x10 feet  Green   6x 10 feet  Green  6x 10 feet  green   Sidestep and squat  6x 10 feet   Blue  Side Step and Squat 6x10 feet  Green   6x 10 feet   green  6x 10 feet  green   Ankle 4-way  20x 2# HEP       Fwd/Lat Step Ups 6" step  2x10 6" step  2x10 6" Step  2x10  6" step  2x10  6" step  2x10   Step Up and Over             Eccentric Step Downs 2x10  6' step 2x10  4" step 2x10  4" step  2x10  4' step  2x10 6' step   SLS on Foam  4x 15' hd         BOSU step over and kick out  20x      10x bilat  15x   Bosu March 3 min 2 min 2 min  3 min  3 min   Bosu Lunge 2x10 10x 10x  15x      DF Heel slides              Single limb cone tap on foam 2x10  bilat 10x   bilat         PPT  20x 5" hd   20x5" hold  20x 5" hd  20x 5' hd   Bridges 20x  20x 20x 20x   LTR/DKTC with Tball 20x  20x each 2x15 each 2x15 each   Hip Abd/Add        Tball Abd Crunch        Sidelying Bridge                                          Modalities 7/26 7/11 7/12 7/19 7/23   CP to L ankle 15 min 15 min 15 min and low back  held  15 min low back                                    Assessment: Patient tolerated treatment well  No exacerbation of sx with program  Patient would benefit from continued therapy to decrease pain and increase strength and flexibility to improve gait and overall LOF  Plan: Continue per plan of care  Progress treatment as tolerated

## 2018-07-30 ENCOUNTER — APPOINTMENT (OUTPATIENT)
Dept: PHYSICAL THERAPY | Facility: CLINIC | Age: 43
End: 2018-07-30
Payer: COMMERCIAL

## 2018-07-31 ENCOUNTER — OFFICE VISIT (OUTPATIENT)
Dept: PHYSICAL THERAPY | Facility: CLINIC | Age: 43
End: 2018-07-31
Payer: COMMERCIAL

## 2018-07-31 DIAGNOSIS — M54.42 ACUTE MIDLINE LOW BACK PAIN WITH LEFT-SIDED SCIATICA: ICD-10-CM

## 2018-07-31 DIAGNOSIS — M25.572 ACUTE LEFT ANKLE PAIN: ICD-10-CM

## 2018-07-31 DIAGNOSIS — S82.65XD CLOSED NONDISPLACED FRACTURE OF LATERAL MALLEOLUS OF LEFT FIBULA WITH ROUTINE HEALING: Primary | ICD-10-CM

## 2018-07-31 PROCEDURE — 97110 THERAPEUTIC EXERCISES: CPT | Performed by: PHYSICAL THERAPIST

## 2018-07-31 PROCEDURE — 97140 MANUAL THERAPY 1/> REGIONS: CPT | Performed by: PHYSICAL THERAPIST

## 2018-07-31 NOTE — PROGRESS NOTES
Daily Note     Today's date: 2018  Patient name: Marcos Roe  : 1975  MRN: [id number]  Referring provider: Collin Dozier PA-C  Dx:   Encounter Diagnosis     ICD-10-CM    1  Closed nondisplaced fracture of lateral malleolus of left fibula with routine healing S82 65XD    2  Acute left ankle pain M25 572    3  Acute midline low back pain with left-sided sciatica M54 42        Start Time: 1600  Stop Time: 1720  Total time in clinic (min): 80 minutes    Subjective: Pt reports she still has the throbbing pain in the low back with radiating symptoms into the leg  Reports it has improved since treatment focused on the low back, but continues to have pain        Objective: See treatment diary below  Precautions: WBAT LLE; low back pain     Specialty Daily Treatment Diary      Manual     L foot/ankle PROM and joint mobs 15 min          BLE PROM   15 min  15 min  15 min  15 min                       Exercise Diary     [address] [facility] L4 10min L5 10 min L4 10 min  L4 10 min  L4 10 min   Standing SLR 3-way on foam 2x10  2# 2x10  2#  2x10  1 5# Bilat   2x10  1 5#  2x10  2#   Rocker Board    20x  no hands  20x     Monster walk 6x 10 feet  blue 6x10 feet  Blue  6x10 feet  Green   6x 10 feet  Green  6x 10 feet  green   Sidestep and squat  6x 10 feet   Blue  6x10 feet  Blue  6x10 feet  Green   6x 10 feet   green  6x 10 feet  green   Ankle 4-way    HEP       Fwd/Lat Step Ups 6" step  2x10 2x10  6" step 6" Step  2x10  6" step  2x10  6" step  2x10   Step Up and Over            Eccentric Step Downs 2x10  6' step 2x10  6" step 2x10  4" step  2x10  4' step  2x10 6' step   SLS on Foam            BOSU step over and kick out  20x 20x    10x bilat  15x   Bosu March 3 min 3 min 2 min  3 min  3 min   Bosu Lunge 2x10 2x10 10x  15x      DF Heel slides             Single limb cone tap on foam 2x10  bilat 2x10  Bilat          PPT  20x 5" hd 20x5" hold 20x5" hold  20x 5" hd  20x 5' hd   Bridges 20x 20x 20x 20x 20x   LTR/DKTC with Tball 20x 20x each 20x each 2x15 each 2x15 each   Hip Abd/Add            Tball Abd Crunch            Sidelying Bridge                                                             Modalities 7/26 7/31 7/12 7/19 7/23   CP to L ankle 15 min 15 min to low back 15 min and low back  held  15 min low back                                     Assessment: Tolerated treatment well  Patient exhibited good technique with therapeutic exercises and would benefit from continued PT  Pt with good tolerance to exercises this visit  No increased pain noted t/o treatment  No adverse reaction noted to CP application post treatment  Plan: Continue per plan of care  Progress treatment as tolerated

## 2018-08-02 ENCOUNTER — OFFICE VISIT (OUTPATIENT)
Dept: PHYSICAL THERAPY | Facility: CLINIC | Age: 43
End: 2018-08-02
Payer: COMMERCIAL

## 2018-08-02 DIAGNOSIS — S82.65XD CLOSED NONDISPLACED FRACTURE OF LATERAL MALLEOLUS OF LEFT FIBULA WITH ROUTINE HEALING: ICD-10-CM

## 2018-08-02 DIAGNOSIS — M54.42 ACUTE MIDLINE LOW BACK PAIN WITH LEFT-SIDED SCIATICA: Primary | ICD-10-CM

## 2018-08-02 DIAGNOSIS — M25.572 ACUTE LEFT ANKLE PAIN: ICD-10-CM

## 2018-08-02 PROCEDURE — 97140 MANUAL THERAPY 1/> REGIONS: CPT

## 2018-08-02 PROCEDURE — 97110 THERAPEUTIC EXERCISES: CPT

## 2018-08-02 NOTE — PROGRESS NOTES
Daily Note     Today's date: 2018  Patient name: Selina Landa  : 1975  MRN: [id number]  Referring provider: Melissa Buckner PA-C  Dx:   Encounter Diagnosis     ICD-10-CM    1  Acute midline low back pain with left-sided sciatica M54 42    2  Acute left ankle pain M25 572    3  Closed nondisplaced fracture of lateral malleolus of left fibula with routine healing S82 65XD                   Subjective:  Patient reports throbbing in the leg and ankle with levels of 3/10 at the start of the session         Objective: See treatment diary below    Precautions: WBAT LLE; low back pain     Specialty Daily Treatment Diary      Manual     L foot/ankle PROM and joint mobs 15 min          BLE PROM   15 min  15 min  15 min  15 min                       Exercise Diary     SRC L4 10min L5 10 min L4 10 min  L4 10 min  L4 10 min   Standing SLR 3-way on foam 2x10  2# 2x10  2#  2x10  2# Bilat   2x10  1 5#  2x10  2#   Rocker Board      no hands  20x     Monster walk 6x 10 feet  blue 6x10 feet  Blue  6x10 feet  Green   6x 10 feet  Green  6x 10 feet  green   Sidestep and squat  6x 10 feet   Blue  6x10 feet  Blue  6x10 feet  Green   6x 10 feet   green  6x 10 feet  green   Ankle 4-way    HEP       Fwd/Lat Step Ups 6" step  2x10 2x10  6" step 6" Step  2x10  6" step  2x10  6" step  2x10   Step Up and Over            Eccentric Step Downs 2x10  6' step 2x10  6" step 2x10  4" step  2x10  4' step  2x10 6' step   SLS on Foam            BOSU step over and kick out  20x 20x    10x bilat  15x   Bosu March 3 min 3 min 2 min  3 min  3 min   Bosu Lunge 2x10 2x10 2x10  15x      DF Heel slides             Single limb cone tap on foam 2x10  bilat 2x10  Bilat  2x10  bilat         PPT  20x 5" hd 20x5" hold 20x5" hold  20x 5" hd  20x 5' hd   Bridges 20x 20x with PPT 20x 20x 20x   LTR/DKTC with Tball 20x 20x each 20x each 2x15 each 2x15 each   Hip Abd/Add            Tball Abd Crunch          Sidelying Bridge                                                             Modalities 7/26 8/2 7/12 7/19 7/23   CP to L ankle 15 min 15 min to low back 15 min and low back  held  15 min low back                                        Assessment: No progression with program due to aforementioned subjective comments  Patient able tolerate program well despite high pain levels  Will continue to monitor pain levels and progress as able      Plan: Continue per plan of care

## 2018-08-07 ENCOUNTER — APPOINTMENT (OUTPATIENT)
Dept: PHYSICAL THERAPY | Facility: CLINIC | Age: 43
End: 2018-08-07
Payer: COMMERCIAL

## 2018-08-08 ENCOUNTER — OFFICE VISIT (OUTPATIENT)
Dept: PHYSICAL THERAPY | Facility: CLINIC | Age: 43
End: 2018-08-08
Payer: COMMERCIAL

## 2018-08-08 DIAGNOSIS — S82.65XD CLOSED NONDISPLACED FRACTURE OF LATERAL MALLEOLUS OF LEFT FIBULA WITH ROUTINE HEALING: ICD-10-CM

## 2018-08-08 DIAGNOSIS — M54.42 ACUTE MIDLINE LOW BACK PAIN WITH LEFT-SIDED SCIATICA: Primary | ICD-10-CM

## 2018-08-08 DIAGNOSIS — M25.572 ACUTE LEFT ANKLE PAIN: ICD-10-CM

## 2018-08-08 PROCEDURE — 97110 THERAPEUTIC EXERCISES: CPT

## 2018-08-08 PROCEDURE — 97140 MANUAL THERAPY 1/> REGIONS: CPT

## 2018-08-08 NOTE — PROGRESS NOTES
Daily Note     Today's date: 2018  Patient name: Kaylee Chao  : 1975  MRN: [id number]  Referring provider: Marin Montana PA-C  Dx:   Encounter Diagnosis     ICD-10-CM    1  Acute midline low back pain with left-sided sciatica M54 42    2  Acute left ankle pain M25 572    3   Closed nondisplaced fracture of lateral malleolus of left fibula with routine healing S82 65XD        Start Time: 0850          Subjective: " the throbbing pain is gone but I still feel the pulling "      Objective: See treatment diary below    Precautions: WBAT LLE; low back pain     Specialty Daily Treatment Diary      Manual     L foot/ankle PROM and joint mobs 15 min          BLE PROM   15 min  15 min  15 min  15 min                       Exercise Diary     SRC L4 10min L5 10 min L4 10 min  L5 10 min  L4 10 min   Standing SLR 3-way on foam 2x10  2# 2x10  2#  2x10  2# Bilat   2x10  1 5#  2x10  2#   Rocker Board           Monster walk 6x 10 feet  blue 6x10 feet  Blue  6x10 feet  green  6x 10 feet  Blue   6x 10 feet  green   Sidestep and squat  6x 10 feet   Blue  6x10 feet  Blue  6x10 feet  Green   6x 10 feet   blue  6x 10 feet  green   Ankle 4-way    HEP       Fwd/Lat Step Ups 6" step  2x10 2x10  6" step 6" Step  2x10  8" step  2x10  6" step  2x10   Step Up and Over            Eccentric Step Downs 2x10  6' step 2x10  6" step 2x10  4" step  2x10  6' step  2x10 6' step   SLS on Foam            BOSU step over and kick out  20x 20x    10x bilat  15x   Bosu March 3 min 3 min 2 min  3 min  3 min   Bosu Lunge 2x10 2x10 2x10  15x      DF Heel slides             Single limb cone tap on foam 2x10  bilat 2x10  Bilat  2x10  bilat    2x10  bilat     PPT  20x 5" hd 20x5" hold 20x5" hold  20x 5" hd  20x 5' hd   Bridges 20x 20x with PPT 20x 20x 20x   LTR/DKTC with Tball 20x 20x each 20x each 2x15 each 2x15 each   Hip Abd/Add            Tball Abd Crunch            Sidelying Bridge                                                             Modalities 7/26 8/2 7/12 8/8 7/23   CP to L ankle 15 min 15 min to low back 15 min and low back  MHP to LB  15 min  15 min low back                                    Assessment: Patient able to progress with repetitions and resistance with various exercises  Good tolerance with progression with minimal cues required  Patient continues to present with deficits with strength and ROM requiring continued skilled physical therapy      Plan: Continue per plan of care

## 2018-08-09 ENCOUNTER — OFFICE VISIT (OUTPATIENT)
Dept: PHYSICAL THERAPY | Facility: CLINIC | Age: 43
End: 2018-08-09
Payer: COMMERCIAL

## 2018-08-09 ENCOUNTER — APPOINTMENT (OUTPATIENT)
Dept: PHYSICAL THERAPY | Facility: CLINIC | Age: 43
End: 2018-08-09
Payer: COMMERCIAL

## 2018-08-09 DIAGNOSIS — M25.572 ACUTE LEFT ANKLE PAIN: ICD-10-CM

## 2018-08-09 DIAGNOSIS — S82.65XD CLOSED NONDISPLACED FRACTURE OF LATERAL MALLEOLUS OF LEFT FIBULA WITH ROUTINE HEALING: ICD-10-CM

## 2018-08-09 DIAGNOSIS — M54.42 ACUTE MIDLINE LOW BACK PAIN WITH LEFT-SIDED SCIATICA: Primary | ICD-10-CM

## 2018-08-09 PROCEDURE — 97140 MANUAL THERAPY 1/> REGIONS: CPT

## 2018-08-09 PROCEDURE — 97110 THERAPEUTIC EXERCISES: CPT

## 2018-08-09 NOTE — PROGRESS NOTES
Daily Note     Today's date: 2018  Patient name: Kaylee Chao  : 1975  MRN: [id number]  Referring provider: Marin Montana PA-C  Dx:   Encounter Diagnosis     ICD-10-CM    1  Acute midline low back pain with left-sided sciatica M54 42    2  Acute left ankle pain M25 572    3   Closed nondisplaced fracture of lateral malleolus of left fibula with routine healing S82 65XD                   Subjective: "I have the pulling of the sciatic nerve like always but no pain "      Objective: See treatment diary below  Precautions: WBAT LLE; low back pain     Specialty Daily Treatment Diary      Manual     L foot/ankle PROM and joint mobs 15 min          BLE PROM   15 min  15 min  15 min  15 min                       Exercise Diary     SRC L4 10min L5 10 min L4 10 min  L5 10 min  L5 10 min   Standing SLR 3-way on foam 2x10  2# 2x10  2#  2x10  2# Bilat   2x10  1 5#  2x10  2#   Rocker Board           Monster walk 6x 10 feet  blue 6x10 feet  Blue  6x10 feet  green  6x 10 feet  Blue   6x 10 feet  Blue   Sidestep and squat  6x 10 feet   Blue  6x10 feet  Blue  6x10 feet  Green   6x 10 feet   blue  6x 10 feet  Blue   Ankle 4-way    HEP       Fwd/Lat Step Ups 6" step  2x10 2x10  6" step 6" Step  2x10  8" step  2x10  6" step  2x10   Step Up and Over            Eccentric Step Downs 2x10  6' step 2x10  6" step 2x10  4" step  2x10  6' step  2x10 6' step   SLS on Foam            BOSU step over and kick out  20x 20x    10x bilat  15x   Bosu March 3 min 3 min 2 min  3 min  3 min   Bosu Lunge 2x10 2x10 2x10  15x  15x    DF Heel slides             Single limb cone tap on foam 2x10  bilat 2x10  Bilat  2x10  bilat    2x10  bilat  2x10  bilat   PPT  20x 5" hd 20x5" hold 20x5" hold  20x 5" hd  20x 5' hd   Bridges 20x 20x with PPT 20x 20x 20x   LTR/DKTC with Tball 20x 20x each 20x each 2x15 each 2x15 each   Hip Abd/Add            Tball Abd Crunch            Sidelying Bridge                                                             Modalities 7/26 8/2 7/12 8/8 8/9   CP to L ankle 15 min 15 min to low back 15 min and low back  MHP to LB  15 min  15 min low back                                  Assessment: Tolerated treatment well  Patient demonstrated fatigue post treatment, exhibited good technique with therapeutic exercises and would benefit from continued PT      Plan: Continue per plan of care  Progress treatment as tolerated

## 2018-08-13 ENCOUNTER — OFFICE VISIT (OUTPATIENT)
Dept: PHYSICAL THERAPY | Facility: CLINIC | Age: 43
End: 2018-08-13
Payer: COMMERCIAL

## 2018-08-13 DIAGNOSIS — S82.65XD NONDISPLACED FRACTURE OF LATERAL MALLEOLUS OF LEFT FIBULA, SUBSEQUENT ENCOUNTER FOR CLOSED FRACTURE WITH ROUTINE HEALING: ICD-10-CM

## 2018-08-13 DIAGNOSIS — S82.65XD CLOSED NONDISPLACED FRACTURE OF LATERAL MALLEOLUS OF LEFT FIBULA WITH ROUTINE HEALING: ICD-10-CM

## 2018-08-13 DIAGNOSIS — M25.572 ACUTE LEFT ANKLE PAIN: ICD-10-CM

## 2018-08-13 DIAGNOSIS — M54.42 ACUTE MIDLINE LOW BACK PAIN WITH LEFT-SIDED SCIATICA: Primary | ICD-10-CM

## 2018-08-13 PROCEDURE — 97110 THERAPEUTIC EXERCISES: CPT | Performed by: PHYSICAL THERAPIST

## 2018-08-13 PROCEDURE — 97140 MANUAL THERAPY 1/> REGIONS: CPT | Performed by: PHYSICAL THERAPIST

## 2018-08-13 PROCEDURE — G8978 MOBILITY CURRENT STATUS: HCPCS | Performed by: PHYSICAL THERAPIST

## 2018-08-13 PROCEDURE — G8979 MOBILITY GOAL STATUS: HCPCS | Performed by: PHYSICAL THERAPIST

## 2018-08-13 NOTE — LETTER
2018    Oscar Bolden PA-C  [address] [address]    Patient: Eliezer Buckley   YOB: 1975   Date of Visit: 2018     Encounter Diagnosis     ICD-10-CM    1  Acute midline low back pain with left-sided sciatica M54 42    2  Acute left ankle pain M25 572    3  Closed nondisplaced fracture of lateral malleolus of left fibula with routine healing S82 65XD    4  Nondisplaced fracture of lateral malleolus of left fibula, subsequent encounter for closed fracture with routine healing S82 65XD        Dear Dr Shaikh Safer:    Please review the attached Plan of Care from [facility] recent visit  Please verify that you agree therapy should continue by signing the attached document and sending it back to our office  If you have any questions or concerns, please don't hesitate to call  Sincerely,    Mejia Yo, PT      Referring Provider:      I certify that I have read the below Plan of Care and certify the need for these services furnished under this plan of treatment while under my care  Oscar Bolden PA-C  [address] [facility]  [address]  [facility]  [address]           Daily Note     Today's date: 2018  Patient name: Eliezer Buckley  : 1975  MRN: [id number]  Referring provider: Cinthya Haley PA-C  Dx:   Encounter Diagnosis     ICD-10-CM    1  Acute midline low back pain with left-sided sciatica M54 42    2  Acute left ankle pain M25 572    3  Closed nondisplaced fracture of lateral malleolus of left fibula with routine healing S82 65XD                   Subjective: Pt with no new complaints noted at this time  Notes she continues to improve with PT  See RE for details          Objective: See treatment diary below  Precautions: WBAT LLE; low back pain     Specialty Daily Treatment Diary      Manual   8  8   L foot/ankle PROM and joint mobs            BLE PROM  15 min  15 min  15 min  15 min  15 min    STM/TFM to the left piriformis   X                 Exercise Diary    8   SRC L5 10 min L5 10 min L4 10 min  L5 10 min  L5 10 min   Standing SLR 3-way on foam 2x10  2# 2x10  2#  2x10  2# Bilat   2x10  1 5#  2x10  2#   Rocker Board            Monster walk 6x10 feet   Blue  6x10 feet  Blue  6x10 feet  green  6x 10 feet  Blue   6x 10 feet  Blue   Side step and squat  6x10 feet  Blue  6x10 feet  Blue  6x10 feet  Green   6x 10 feet   blue  6x 10 feet  Blue   Ankle 4-way    HEP       Fwd/Lat Step Ups 8" step  2x10 2x10  6" step 6" Step  2x10  8" step  2x10  6" step  2x10   Step Up and Over            Eccentric Step Downs 8" step  2x10 2x10  6" step 2x10  4" step  2x10  6' step  2x10 6' step   SLS on Foam            BOSU step over and kick out 2x10 Bilat  20x    10x bilat  15x   Bosu March 3 min  3 min 2 min  3 min  3 min   Bosu Lunge 2x10 Bilat  2x10 2x10  15x  15x    DF Heel slides             Single limb cone tap on foam 2x10 Bilat  2x10  Bilat  2x10  bilat     2x10  bilat  2x10  bilat   PPT  20x5" hold 20x5" hold  20x 5" hd  20x 5' hd   Bridges  20x with PPT 20x 20x 20x   LTR/DKTC with Tball  20x each 20x each 2x15 each 2x15 each   Hip Abd/Add             Tball Abd Crunch             Sidelying Bridge                                                             Modalities    MHP to LB and piriformis in seated  15 min 15 min to low back 15 min and low back  MHP to LB  15 min  15 min low back                                     Assessment: Tolerated treatment well  PT notes continuation of progression of TE program with focus on gait/balance and manual therapy to decrease pain levels and improve functional limitations to meet therapy goals  See RE for details  Plan: Continue per plan of care             PT Re-Evaluation     Today's date: 2018  Patient name: Alba Mendoza  : 1975  MRN: [id number]  Referring provider: Bereket Aviles PA-C  Dx:   Encounter Diagnosis ICD-10-CM    1  Acute midline low back pain with left-sided sciatica M54 42    2  Acute left ankle pain M25 572    3  Closed nondisplaced fracture of lateral malleolus of left fibula with routine healing S82 65XD                   Assessment  Impairments: abnormal gait, abnormal or restricted ROM, activity intolerance, impaired balance, impaired physical strength, lacks appropriate home exercise program, pain with function and weight-bearing intolerance    Assessment details: PT notes the patient with improved ROM and strength t/o the left ankle and hip but continuation of left piriformis tightness and inflammation with radicular symptoms in the left LE leading to increase pain levels and functional limitations with need for continuation of skilled therapy for 3-4 weeks with focus on gait/balance, manual therapy, strengthening, analgesic modalities and update/review of HEP with progression to HEP/wellness program   PT notes extended case secondary to development of left piriformis syndrome during recovery from left ankle fracture  Understanding of Dx/Px/POC: good   Prognosis: good    Goals  STG  1  Pt will demonstrate 0/10 pain with ADLs and functional mobility- Partially met  2  Pt will improve AROM of the L ankle to WNL- MET  3  Initiate HEP with good tolerance from pt- MET    LTG   1  Pt will improve standing static and dynamic balance to WNL- In progress  2  Pt will demonstrate 5/5 t/o BLE- Partially met  3  Pt will be independent with HEP and appropriate for d/c- In progress  4  Pt will improve core strength to WFL-Partial MET   5   Pt will demonstrate no radiating symptoms into the LLE-Partial MET     Plan  Patient would benefit from: PT eval and skilled physical therapy  Planned modality interventions: cryotherapy, unattended electrical stimulation and thermotherapy: hydrocollator packs  Planned therapy interventions: joint mobilization, massage, manual therapy, neuromuscular re-education, balance, balance/weight bearing training, flexibility, gait training, graded exercise, home exercise program, patient education, postural training, strengthening, stretching, therapeutic exercise and abdominal trunk stabilization  Frequency: 2x week  Duration in visits: 8  Duration in weeks: 4  Treatment plan discussed with: patient  Plan details: Discussed findings of POC with pt, pt agreeable to skilled PT 2x/wk for 4 wks        Subjective Evaluation    History of Present Illness  Date of onset: 2018  Mechanism of injury: Pt presents s/p fall resulting in non-displaced fx of the L lateral malleolus on 18  Pt reports she was going down on the stairs and felt the ankle give out while she was stepping down onto her left foot  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, and has rolled her L ankle several times, but none as serious as this  Pt was originally placed in short cast after injury, but was transitioned to CAM boot at recent f/u appt with MD on   Pt is scheduled to f/u with MD on   Presently, pt has attended 16 sessions of OPPT since initial evaluation and feels 80% improvement with overall decrease pain levels and increase strength in the legs but continuation of feeling unsteady with standing one foot and uneven surfaces  Patient reports continuation of numbness in the left ankle with occasional pain traveling down the left leg  Patient reports her ankle is doing very well but like to continue OPPT to ensure return to PLOF     Not a recurrent problem   Quality of life: good    Pain  Current pain ratin  At best pain ratin  At worst pain rating: 3  Location: Left ankle and low back  Quality: discomfort and pulling  Relieving factors: rest and ice  Aggravating factors: standing and walking  Progression: improved      Diagnostic Tests  X-ray: abnormal  Treatments  No previous or current treatments  Current treatment: chiropractic and physical therapy  Patient Goals  Patient goals for therapy: decreased edema, decreased pain, improved balance, increased motion, increased strength and return to sport/leisure activities        Objective     Palpation   Left   Muscle spasm in the piriformis  Tenderness of the piriformis  Trigger point to piriformis  Additional Palpation Details  Pt notes no TTP of b/l LE musculature     Tenderness   Left Ankle/Foot   No tenderness  Right Ankle/Foot   No tenderness  Neurological Testing     Reflexes   Left   Patellar (L4): normal (2+)  Achilles (S1): normal (2+)    Right   Patellar (L4): normal (2+)  Achilles (S1): normal (2+)    Active Range of Motion   Left Hip   Normal active range of motion    Right Hip   Normal active range of motion  Left Ankle/Foot   Normal active range of motion  Dorsiflexion (ke): 20 degrees   Plantar flexion: 50 degrees   Inversion: 30 degrees   Eversion: 20 degrees     Right Ankle/Foot   Normal active range of motion  Dorsiflexion (ke): 20 degrees   Plantar flexion: 50 degrees   Inversion: 30 degrees   Eversion: 25 degrees     Additional Active Range of Motion Details  PT notes the patient with WFL AROM of B/L ankles    Strength/Myotome Testing     Left Hip   Planes of Motion   Flexion: 4  Extension: 5  Abduction: 4+  Adduction: 4+  External rotation: 4-  Internal rotation: 4+    Right Hip   Planes of Motion   Flexion: 5  Extension: 5  Abduction: 5  Adduction: 5  External rotation: 5  Internal rotation: 5    Left Knee   Flexion: 5  Extension: 4+    Right Knee   Flexion: 5  Extension: 5    Left Ankle/Foot   Dorsiflexion: 5  Plantar flexion: 5  Inversion: 4+  Eversion: 4+    Right Ankle/Foot   Normal strength  Dorsiflexion: 5  Plantar flexion: 5  Inversion: 5  Eversion: 5    Tests       Thoracic   Negative slump  Lumbar   Positive repeated extension  Negative repeated flexion and slump  Left   Positive crossed SLR  Negative femoral stretch, passive SLR, quadrant, sural bias, tibial bias and vertical compression  Left Pelvic Girdle/Sacrum   Negative: sacrum compression and gapping  Left Hip   Positive Alfreda and piriformis  Left Knee   Negative peroneal nerve tension  Additional Tests Details  Pt reported tightness in the low back/posterior gluteal region with repeated extension and tingling in L toes  Ambulation   Weight-Bearing Status     Additional Weight-Bearing Status Details  WBAT, no assitive device or CAM boot  Using ankle brace on L ankle for support/stability     Observational Gait   Gait: within functional limits   Walking speed and stride length within functional limits     Left arm swing: within functional limits  Right arm swing: within functional limits  Base of support: normal      Flowsheet Rows      Most Recent Value   PT/OT G-Codes   FOTO information reviewed  Yes   Assessment Type  Re-evaluation   G code set  Mobility: Walking & Moving Around   Mobility: Walking and Moving Around Current Status ()  CK   Mobility: Walking and Moving Around Goal Status ()  CJ        Precautions: WBAT LLE; low back pain

## 2018-08-13 NOTE — PROGRESS NOTES
Daily Note     Today's date: 2018  Patient name: Martin Anderson  : 1975  MRN: [id number]  Referring provider: Tiburcio Burgess PA-C  Dx:   Encounter Diagnosis     ICD-10-CM    1  Acute midline low back pain with left-sided sciatica M54 42    2  Acute left ankle pain M25 572    3  Closed nondisplaced fracture of lateral malleolus of left fibula with routine healing S82 65XD                   Subjective: Pt with no new complaints noted at this time  Notes she continues to improve with PT  See RE for details          Objective: See treatment diary below  Precautions: WBAT LLE; low back pain     Specialty Daily Treatment Diary      Manual     L foot/ankle PROM and joint mobs            BLE PROM  15 min  15 min  15 min  15 min  15 min    STM/TFM to the left piriformis   X                 Exercise Diary     SRC L5 10 min L5 10 min L4 10 min  L5 10 min  L5 10 min   Standing SLR 3-way on foam 2x10  2# 2x10  2#  2x10  2# Bilat   2x10  1 5#  2x10  2#   Rocker Board            Monster walk 6x10 feet   Blue  6x10 feet  Blue  6x10 feet  green  6x 10 feet  Blue   6x 10 feet  Blue   Side step and squat  6x10 feet  Blue  6x10 feet  Blue  6x10 feet  Green   6x 10 feet   blue  6x 10 feet  Blue   Ankle 4-way    HEP       Fwd/Lat Step Ups 8" step  2x10 2x10  6" step 6" Step  2x10  8" step  2x10  6" step  2x10   Step Up and Over            Eccentric Step Downs 8" step  2x10 2x10  6" step 2x10  4" step  2x10  6' step  2x10 6' step   SLS on Foam            BOSU step over and kick out 2x10 Bilat  20x    10x bilat  15x   Bosu March 3 min  3 min 2 min  3 min  3 min   Bosu Lunge 2x10 Bilat  2x10 2x10  15x  15x    DF Heel slides             Single limb cone tap on foam 2x10 Bilat  2x10  Bilat  2x10  bilat     2x10  bilat  2x10  bilat   PPT  20x5" hold 20x5" hold  20x 5" hd  20x 5' hd   Bridges  20x with PPT 20x 20x 20x   LTR/DKTC with Tball  20x each 20x each 2x15 each 2x15 each   Hip Abd/Add             Tball Abd Crunch             Sidelying Bridge                                                             Modalities 8/13 8/2 7/12 8/8 8/9   MHP to LB and piriformis in seated  15 min 15 min to low back 15 min and low back  MHP to LB  15 min  15 min low back                                     Assessment: Tolerated treatment well  PT notes continuation of progression of TE program with focus on gait/balance and manual therapy to decrease pain levels and improve functional limitations to meet therapy goals  See RE for details  Plan: Continue per plan of care

## 2018-08-13 NOTE — LETTER
2018    Daisy Brice PA-C  [facility] [employer] [address]  [address] [address]    Patient: Frank Phillips   YOB: 1975   Date of Visit: 2018     Encounter Diagnosis     ICD-10-CM    1  Acute midline low back pain with left-sided sciatica M54 42    2  Acute left ankle pain M25 572    3  Closed nondisplaced fracture of lateral malleolus of left fibula with routine healing S82 65XD    4  Nondisplaced fracture of lateral malleolus of left fibula, subsequent encounter for closed fracture with routine healing S82 65XD        Dear Dr Birch [place]:    Please review the attached Plan of Care from [facility] recent visit  Please verify that you agree therapy should continue by signing the attached document and sending it back to our office  If you have any questions or concerns, please don't hesitate to call  Sincerely,    Santino Quintana, PT      Referring Provider:      I certify that I have read the below Plan of Care and certify the need for these services furnished under this plan of treatment while under my care  Daisy Brice PA-C  [facility] [employer]   [address]  [address] [address]           Daily Note     Today's date: 2018  Patient name: Frank Phillips  : 1975  MRN: [id number]  Referring provider: Omayra Marcano PA-C  Dx:   Encounter Diagnosis     ICD-10-CM    1  Acute midline low back pain with left-sided sciatica M54 42    2  Acute left ankle pain M25 572    3  Closed nondisplaced fracture of lateral malleolus of left fibula with routine healing S82 65XD                   Subjective: Pt with no new complaints noted at this time   Notes she continues to improve with PT        Objective: See treatment diary below  Precautions: WBAT LLE; low back pain     Specialty Daily Treatment Diary      Manual     L foot/ankle PROM and joint mobs            BLE PROM  15 min  15 min  15 min  15 min  15 min    STM/TFM to the left piriformis Ryanne Chisholm                 Exercise Diary    8   SRC L5 10 min L5 10 min L4 10 min  L5 10 min  L5 10 min   Standing SLR 3-way on foam 2x10  2# 2x10  2#  2x10  2# Bilat   2x10  1 5#  2x10  2#   Rocker Board            Monster walk 6x10 feet   Blue  6x10 feet  Blue  6x10 feet  green  6x 10 feet  Blue   6x 10 feet  Blue   Side step and squat  6x10 feet  Blue  6x10 feet  Blue  6x10 feet  Green   6x 10 feet   blue  6x 10 feet  Blue   Ankle 4-way    HEP       Fwd/Lat Step Ups 8" step  2x10 2x10  6" step 6" Step  2x10  8" step  2x10  6" step  2x10   Step Up and Over            Eccentric Step Downs 8" step  2x10 2x10  6" step 2x10  4" step  2x10  6' step  2x10 6' step   SLS on Foam            BOSU step over and kick out 2x10 Bilat  20x    10x bilat  15x   Bosu March 3 min  3 min 2 min  3 min  3 min   Bosu Lunge 2x10 Bilat  2x10 2x10  15x  15x    DF Heel slides             Single limb cone tap on foam 2x10 Bilat  2x10  Bilat  2x10  bilat     2x10  bilat  2x10  bilat   PPT  20x5" hold 20x5" hold  20x 5" hd  20x 5' hd   Bridges  20x with PPT 20x 20x 20x   LTR/DKTC with Tball  20x each 20x each 2x15 each 2x15 each   Hip Abd/Add             Tball Abd Crunch             Sidelying Bridge                                                             Modalities    MHP to LB and piriformis in seated  15 min 15 min to low back 15 min and low back  MHP to LB  15 min  15 min low back                                     Assessment: Tolerated treatment {Tolerated treatment :2207111743}  Patient {assessment:6020151406}      Plan: {PLAN:8596684487}          PT Re-Evaluation     Today's date: 2018  Patient name: Jerica Lewis  : 1975  MRN: [id number]  Referring provider: Raul Sparrow PA-C  Dx:   Encounter Diagnosis     ICD-10-CM    1  Acute midline low back pain with left-sided sciatica M54 42    2  Acute left ankle pain M25 572    3   Closed nondisplaced fracture of lateral malleolus of left fibula with routine healing S82 65XD                   Assessment  Impairments: abnormal gait, abnormal or restricted ROM, activity intolerance, impaired balance, impaired physical strength, lacks appropriate home exercise program, pain with function and weight-bearing intolerance    Assessment details: PT notes the patient with improved ROM and strength t/o the left ankle and hip but continuation of left piriformis tightness and inflammation with radicular symptoms in the left LE leading to increase pain levels and functional limitations with need for continuation of skilled therapy for 3-4 weeks with focus on gait/balance, manual therapy, strengthening, analgesic modalities and update/review of HEP with progression to HEP/wellness program   PT notes extended case secondary to development of left piriformis syndrome during recovery from left ankle fracture  Understanding of Dx/Px/POC: good   Prognosis: good    Goals  STG  1  Pt will demonstrate 0/10 pain with ADLs and functional mobility- Partially met  2  Pt will improve AROM of the L ankle to WNL- MET  3  Initiate HEP with good tolerance from pt- MET    LTG   1  Pt will improve standing static and dynamic balance to WNL- In progress  2  Pt will demonstrate 5/5 t/o BLE- Partially met  3  Pt will be independent with HEP and appropriate for d/c- In progress  4  Pt will improve core strength to WFL-Partial MET   5   Pt will demonstrate no radiating symptoms into the LLE-Partial MET     Plan  Patient would benefit from: PT eval and skilled physical therapy  Planned modality interventions: cryotherapy, unattended electrical stimulation and thermotherapy: hydrocollator packs  Planned therapy interventions: joint mobilization, massage, manual therapy, neuromuscular re-education, balance, balance/weight bearing training, flexibility, gait training, graded exercise, home exercise program, patient education, postural training, strengthening, stretching, therapeutic exercise and abdominal trunk stabilization  Frequency: 2x week  Duration in visits: 8  Duration in weeks: 4  Treatment plan discussed with: patient  Plan details: Discussed findings of POC with pt, pt agreeable to skilled PT 2x/wk for 4 wks        Subjective Evaluation    History of Present Illness  Date of onset: 2018  Mechanism of injury: Pt presents s/p fall resulting in non-displaced fx of the L lateral malleolus on 18  Pt reports she was going down on the stairs and felt the ankle give out while she was stepping down onto her left foot  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, and has rolled her L ankle several times, but none as serious as this  Pt was originally placed in short cast after injury, but was transitioned to CAM boot at recent f/u appt with MD on   Pt is scheduled to f/u with MD on   Presently, pt has attended 16 sessions of OPPT since initial evaluation and feels 80% improvement with overall decrease pain levels and increase strength in the legs but continuation of feeling unsteady with standing one foot and uneven surfaces  Patient reports continuation of numbness in the left ankle with occasional pain traveling down the left leg  Patient reports her ankle is doing very well but like to continue OPPT to ensure return to PLOF     Not a recurrent problem   Quality of life: good    Pain  Current pain ratin  At best pain ratin  At worst pain rating: 3  Location: Left ankle and low back  Quality: discomfort and pulling  Relieving factors: rest and ice  Aggravating factors: standing and walking  Progression: improved      Diagnostic Tests  X-ray: abnormal  Treatments  No previous or current treatments  Current treatment: chiropractic and physical therapy  Patient Goals  Patient goals for therapy: decreased edema, decreased pain, improved balance, increased motion, increased strength and return to sport/leisure activities        Objective     Palpation   Left Muscle spasm in the piriformis  Tenderness of the piriformis  Trigger point to piriformis  Additional Palpation Details  Pt notes no TTP of b/l LE musculature     Tenderness   Left Ankle/Foot   No tenderness  Right Ankle/Foot   No tenderness  Neurological Testing     Reflexes   Left   Patellar (L4): normal (2+)  Achilles (S1): normal (2+)    Right   Patellar (L4): normal (2+)  Achilles (S1): normal (2+)    Active Range of Motion   Left Hip   Normal active range of motion    Right Hip   Normal active range of motion  Left Ankle/Foot   Normal active range of motion  Dorsiflexion (ke): 20 degrees   Plantar flexion: 50 degrees   Inversion: 30 degrees   Eversion: 20 degrees     Right Ankle/Foot   Normal active range of motion  Dorsiflexion (ke): 20 degrees   Plantar flexion: 50 degrees   Inversion: 30 degrees   Eversion: 25 degrees     Additional Active Range of Motion Details  PT notes the patient with WFL AROM of B/L ankles    Strength/Myotome Testing     Left Hip   Planes of Motion   Flexion: 4  Extension: 5  Abduction: 4+  Adduction: 4+  External rotation: 4-  Internal rotation: 4+    Right Hip   Planes of Motion   Flexion: 5  Extension: 5  Abduction: 5  Adduction: 5  External rotation: 5  Internal rotation: 5    Left Knee   Flexion: 5  Extension: 4+    Right Knee   Flexion: 5  Extension: 5    Left Ankle/Foot   Dorsiflexion: 5  Plantar flexion: 5  Inversion: 4+  Eversion: 4+    Right Ankle/Foot   Normal strength  Dorsiflexion: 5  Plantar flexion: 5  Inversion: 5  Eversion: 5    Tests       Thoracic   Negative slump  Lumbar   Positive repeated extension  Negative repeated flexion and slump  Left   Positive crossed SLR  Negative femoral stretch, passive SLR, quadrant, sural bias, tibial bias and vertical compression  Left Pelvic Girdle/Sacrum   Negative: sacrum compression and gapping  Left Hip   Positive Alfreda and piriformis  Left Knee   Negative peroneal nerve tension  Additional Tests Details  Pt reported tightness in the low back/posterior gluteal region with repeated extension and tingling in L toes  Ambulation   Weight-Bearing Status     Additional Weight-Bearing Status Details  WBAT, no assitive device or CAM boot  Using ankle brace on L ankle for support/stability     Observational Gait   Gait: within functional limits   Walking speed and stride length within functional limits     Left arm swing: within functional limits  Right arm swing: within functional limits  Base of support: normal      Flowsheet Rows      Most Recent Value   PT/OT G-Codes   FOTO information reviewed  Yes   Assessment Type  Re-evaluation   G code set  Mobility: Walking & Moving Around   Mobility: Walking and Moving Around Current Status ()  CK   Mobility: Walking and Moving Around Goal Status ()  CJ        Precautions: WBAT LLE; low back pain

## 2018-08-13 NOTE — PROGRESS NOTES
PT Re-Evaluation     Today's date: 2018  Patient name: Jeffrey Nguyen  : 1975  MRN: [id number]  Referring provider: Jacklyn Camejo PA-C  Dx:   Encounter Diagnosis     ICD-10-CM    1  Acute midline low back pain with left-sided sciatica M54 42    2  Acute left ankle pain M25 572    3  Closed nondisplaced fracture of lateral malleolus of left fibula with routine healing S82 65XD                   Assessment  Impairments: abnormal gait, abnormal or restricted ROM, activity intolerance, impaired balance, impaired physical strength, lacks appropriate home exercise program, pain with function and weight-bearing intolerance    Assessment details: PT notes the patient with improved ROM and strength t/o the left ankle and hip but continuation of left piriformis tightness and inflammation with radicular symptoms in the left LE leading to increase pain levels and functional limitations with need for continuation of skilled therapy for 3-4 weeks with focus on gait/balance, manual therapy, strengthening, analgesic modalities and update/review of HEP with progression to HEP/wellness program   PT notes extended case secondary to development of left piriformis syndrome during recovery from left ankle fracture  Understanding of Dx/Px/POC: good   Prognosis: good    Goals  STG  1  Pt will demonstrate 0/10 pain with ADLs and functional mobility- Partially met  2  Pt will improve AROM of the L ankle to WNL- MET  3  Initiate HEP with good tolerance from pt- MET    LTG   1  Pt will improve standing static and dynamic balance to WNL- In progress  2  Pt will demonstrate 5/5 t/o BLE- Partially met  3  Pt will be independent with HEP and appropriate for d/c- In progress  4  Pt will improve core strength to WFL-Partial MET   5   Pt will demonstrate no radiating symptoms into the LLE-Partial MET     Plan  Patient would benefit from: PT eval and skilled physical therapy  Planned modality interventions: cryotherapy, unattended electrical stimulation and thermotherapy: hydrocollator packs  Planned therapy interventions: joint mobilization, massage, manual therapy, neuromuscular re-education, balance, balance/weight bearing training, flexibility, gait training, graded exercise, home exercise program, patient education, postural training, strengthening, stretching, therapeutic exercise and abdominal trunk stabilization  Frequency: 2x week  Duration in visits: 8  Duration in weeks: 4  Treatment plan discussed with: patient  Plan details: Discussed findings of POC with pt, pt agreeable to skilled PT 2x/wk for 4 wks        Subjective Evaluation    History of Present Illness  Date of onset: 2018  Mechanism of injury: Pt presents s/p fall resulting in non-displaced fx of the L lateral malleolus on 18  Pt reports she was going down on the stairs and felt the ankle give out while she was stepping down onto her left foot  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, and has rolled her L ankle several times, but none as serious as this  Pt was originally placed in short cast after injury, but was transitioned to CAM boot at recent f/u appt with MD on   Pt is scheduled to f/u with MD on   Presently, pt has attended 16 sessions of OPPT since initial evaluation and feels 80% improvement with overall decrease pain levels and increase strength in the legs but continuation of feeling unsteady with standing one foot and uneven surfaces  Patient reports continuation of numbness in the left ankle with occasional pain traveling down the left leg  Patient reports her ankle is doing very well but like to continue OPPT to ensure return to PLOF     Not a recurrent problem   Quality of life: good    Pain  Current pain ratin  At best pain ratin  At worst pain rating: 3  Location: Left ankle and low back  Quality: discomfort and pulling  Relieving factors: rest and ice  Aggravating factors: standing and walking  Progression: improved      Diagnostic Tests  X-ray: abnormal  Treatments  No previous or current treatments  Current treatment: chiropractic and physical therapy  Patient Goals  Patient goals for therapy: decreased edema, decreased pain, improved balance, increased motion, increased strength and return to sport/leisure activities        Objective     Palpation   Left   Muscle spasm in the piriformis  Tenderness of the piriformis  Trigger point to piriformis  Additional Palpation Details  Pt notes no TTP of b/l LE musculature     Tenderness   Left Ankle/Foot   No tenderness  Right Ankle/Foot   No tenderness  Neurological Testing     Reflexes   Left   Patellar (L4): normal (2+)  Achilles (S1): normal (2+)    Right   Patellar (L4): normal (2+)  Achilles (S1): normal (2+)    Active Range of Motion   Left Hip   Normal active range of motion    Right Hip   Normal active range of motion  Left Ankle/Foot   Normal active range of motion  Dorsiflexion (ke): 20 degrees   Plantar flexion: 50 degrees   Inversion: 30 degrees   Eversion: 20 degrees     Right Ankle/Foot   Normal active range of motion  Dorsiflexion (ke): 20 degrees   Plantar flexion: 50 degrees   Inversion: 30 degrees   Eversion: 25 degrees     Additional Active Range of Motion Details  PT notes the patient with WFL AROM of B/L ankles    Strength/Myotome Testing     Left Hip   Planes of Motion   Flexion: 4  Extension: 5  Abduction: 4+  Adduction: 4+  External rotation: 4-  Internal rotation: 4+    Right Hip   Planes of Motion   Flexion: 5  Extension: 5  Abduction: 5  Adduction: 5  External rotation: 5  Internal rotation: 5    Left Knee   Flexion: 5  Extension: 4+    Right Knee   Flexion: 5  Extension: 5    Left Ankle/Foot   Dorsiflexion: 5  Plantar flexion: 5  Inversion: 4+  Eversion: 4+    Right Ankle/Foot   Normal strength  Dorsiflexion: 5  Plantar flexion: 5  Inversion: 5  Eversion: 5    Tests       Thoracic   Negative slump       Lumbar   Positive repeated extension  Negative repeated flexion and slump  Left   Positive crossed SLR  Negative femoral stretch, passive SLR, quadrant, sural bias, tibial bias and vertical compression  Left Pelvic Girdle/Sacrum   Negative: sacrum compression and gapping  Left Hip   Positive Alfreda and piriformis  Left Knee   Negative peroneal nerve tension  Additional Tests Details  Pt reported tightness in the low back/posterior gluteal region with repeated extension and tingling in L toes  Ambulation   Weight-Bearing Status     Additional Weight-Bearing Status Details  WBAT, no assitive device or CAM boot  Using ankle brace on L ankle for support/stability     Observational Gait   Gait: within functional limits   Walking speed and stride length within functional limits     Left arm swing: within functional limits  Right arm swing: within functional limits  Base of support: normal      Flowsheet Rows      Most Recent Value   PT/OT G-Codes   FOTO information reviewed  Yes   Assessment Type  Re-evaluation   G code set  Mobility: Walking & Moving Around   Mobility: Walking and Moving Around Current Status ()  CK   Mobility: Walking and Moving Around Goal Status ()  CJ        Precautions: WBAT LLE; low back pain

## 2018-08-14 ENCOUNTER — OFFICE VISIT (OUTPATIENT)
Dept: OBGYN CLINIC | Facility: CLINIC | Age: 43
End: 2018-08-14
Payer: COMMERCIAL

## 2018-08-14 VITALS
WEIGHT: 188 LBS | DIASTOLIC BLOOD PRESSURE: 72 MMHG | HEIGHT: 63 IN | HEART RATE: 80 BPM | BODY MASS INDEX: 33.31 KG/M2 | SYSTOLIC BLOOD PRESSURE: 120 MMHG

## 2018-08-14 DIAGNOSIS — S82.65XD NONDISPLACED FRACTURE OF LATERAL MALLEOLUS OF LEFT FIBULA, SUBSEQUENT ENCOUNTER FOR CLOSED FRACTURE WITH ROUTINE HEALING: Primary | ICD-10-CM

## 2018-08-14 PROCEDURE — 99213 OFFICE O/P EST LOW 20 MIN: CPT | Performed by: ORTHOPAEDIC SURGERY

## 2018-08-14 NOTE — PROGRESS NOTES
Assessment:     1  Nondisplaced fracture of lateral malleolus of left fibula, subsequent encounter for closed fracture with routine healing        Plan:     Problem List Items Addressed This Visit        Musculoskeletal and Integument    Nondisplaced fracture of lateral malleolus of left fibula, subsequent encounter for closed fracture with routine healing - Primary     Continue physical therapy  Activities as tolerated  I discussed with patient with regard to her low back issue  As long as her symptom is improving I recommend her to continue therapy  I advised her to contact us if her symptoms changed  We will see patient back as needed  All patient's questions were answered to her satisfaction  This note is created using dictation transcription  It may contain typographical errors, grammatical errors, improperly dictated words, background noise and other errors  Subjective:     Patient ID: Rolando Huffman is a 37 y o  female  Chief Complaint:  Patient presents to the office for follow up of nondisplaced fracture of left lateral malleolus on 04/10/2018  During her last week in the cam walker boot, she experienced a sciatic pain that went from her left buttock to the tips of her toes  This pain sent her to the ED twice  She has been going to physical therapy for this with some relief  Today, she also complains of numbness and weakness in her left ankle  She denies ankle pain  She is concerned with the numbness and quick fatigue  Patient has been going to chiropractor too  She denies any bowel or bladder incontinence  Allergy:  No Known Allergies  Medications:  all current active meds have been reviewed  Past Medical History:  History reviewed  No pertinent past medical history  Past Surgical History:  History reviewed  No pertinent surgical history    Family History:  Family History   Problem Relation Age of Onset    Atrial fibrillation Mother     Multiple sclerosis Father      Social History:  History   Alcohol Use No     History   Drug use: Unknown     History   Smoking Status    Never Smoker   Smokeless Tobacco    Never Used     Review of Systems   Constitutional: Negative for chills and fever  HENT: Negative for drooling and sneezing  Eyes: Negative for redness  Respiratory: Negative for cough and wheezing  Gastrointestinal: Negative for nausea and vomiting  Psychiatric/Behavioral: Negative for behavioral problems  The patient is not nervous/anxious  Objective:  BP Readings from Last 1 Encounters:   08/14/18 120/72      Wt Readings from Last 1 Encounters:   08/14/18 85 3 kg (188 lb)      BMI:   Estimated body mass index is 33 3 kg/m² as calculated from the following:    Height as of this encounter: 5' 3" (1 6 m)  Weight as of this encounter: 85 3 kg (188 lb)  BSA:   Estimated body surface area is 1 88 meters squared as calculated from the following:    Height as of this encounter: 5' 3" (1 6 m)  Weight as of this encounter: 85 3 kg (188 lb)  Physical Exam   Constitutional: She is oriented to person, place, and time  She appears well-developed and well-nourished  HENT:   Head: Normocephalic and atraumatic  Eyes: Pupils are equal, round, and reactive to light  Neck: Neck supple  Cardiovascular: Intact distal pulses  Pulmonary/Chest: Effort normal    Neurological: She is alert and oriented to person, place, and time  Skin: Skin is warm and dry  Psychiatric: She has a normal mood and affect  Her behavior is normal      Right Ankle Exam     Range of Motion   Plantar flexion: normal     Muscle Strength   Dorsiflexion:  5/5  Plantar flexion:  5/5  Other   Sensation: normal  Pulse: present       Left Ankle Exam   Swelling: none    Tenderness   The patient is experiencing no tenderness       Range of Motion   Dorsiflexion: normal   Plantar flexion: normal     Muscle Strength   Dorsiflexion:  4/5   Plantar flexion:  5/5     Other   Sensation: decreased  Pulse: present    Comments:  Negative modified straight leg raise

## 2018-08-16 ENCOUNTER — OFFICE VISIT (OUTPATIENT)
Dept: PHYSICAL THERAPY | Facility: CLINIC | Age: 43
End: 2018-08-16
Payer: COMMERCIAL

## 2018-08-16 DIAGNOSIS — S82.65XD CLOSED NONDISPLACED FRACTURE OF LATERAL MALLEOLUS OF LEFT FIBULA WITH ROUTINE HEALING: ICD-10-CM

## 2018-08-16 DIAGNOSIS — M54.42 ACUTE MIDLINE LOW BACK PAIN WITH LEFT-SIDED SCIATICA: Primary | ICD-10-CM

## 2018-08-16 DIAGNOSIS — S82.65XD NONDISPLACED FRACTURE OF LATERAL MALLEOLUS OF LEFT FIBULA, SUBSEQUENT ENCOUNTER FOR CLOSED FRACTURE WITH ROUTINE HEALING: ICD-10-CM

## 2018-08-16 DIAGNOSIS — M25.572 ACUTE LEFT ANKLE PAIN: ICD-10-CM

## 2018-08-16 PROCEDURE — 97110 THERAPEUTIC EXERCISES: CPT

## 2018-08-16 PROCEDURE — 97140 MANUAL THERAPY 1/> REGIONS: CPT

## 2018-08-16 NOTE — PROGRESS NOTES
Daily Note     Today's date: 2018  Patient name: Mayda Crum  : 1975  MRN: [id number]  Referring provider: Cecile Parada PA-C  Dx:   Encounter Diagnosis     ICD-10-CM    1  Acute midline low back pain with left-sided sciatica M54 42    2  Acute left ankle pain M25 572    3  Closed nondisplaced fracture of lateral malleolus of left fibula with routine healing S82 65XD    4   Nondisplaced fracture of lateral malleolus of left fibula, subsequent encounter for closed fracture with routine healing S82 65XD        Start Time: 1515          Subjective: Patient stated the ankle is feeling good and the sciatic is only hurting a little       Objective: See treatment diary below    Precautions: WBAT LLE; low back pain     Specialty Daily Treatment Diary      Manual     L foot/ankle PROM and joint mobs            BLE PROM  15 min  15 min  15 min  15 min  15 min    STM/TFM to the left piriformis   X                 Exercise Diary     SRC L5 10 min L5 10 min L4 10 min  L5 10 min  L5 10 min   Standing SLR 3-way on foam 2x10  2# 2x10  2#  2x10  2# Bilat   2x10  1 5#  2x10  2#   Rocker Board            Monster walk 6x10 feet   Blue  8x10 feet  Blue  6x10 feet  green  6x 10 feet  Blue   6x 10 feet  Blue   Side step and squat  6x10 feet  Blue  8x10 feet  Blue  6x10 feet  Green   6x 10 feet   blue  6x 10 feet  Blue   Ankle 4-way    HEP       Fwd/Lat Step Ups 8" step  2x10 2x10  8" step 6" Step  2x10  8" step  2x10  6" step  2x10   Step Up and Over            Eccentric Step Downs 8" step  2x10 2x10  8" step 2x10  4" step  2x10  6' step  2x10 6' step   SLS on Foam            BOSU step over and kick out 2x10 Bilat  20x    10x bilat  15x   Bosu March 3 min  3 min 2 min  3 min  3 min   Bosu Lunge 2x10 Bilat  2x10 2x10  15x  15x    DF Heel slides             Single limb cone tap on foam 2x10 Bilat  2x10  Bilat  2x10  bilat     2x10  bilat  2x10  bilat   PPT  20x5" hold 20x5" hold  20x 5" hd  20x 5' hd   Bridges  20x with PPT 20x 20x 20x   LTR/DKTC with Tball  20x each 20x each 2x15 each 2x15 each   Hip Abd/Add             Tball Abd Crunch             Sidelying Bridge                                                             Modalities 8/13 8/16 7/12 8/8 8/9   MHP to LB and piriformis in seated  15 min Deferred  15 min and low back  MHP to LB  15 min  15 min low back                                     Assessment: Patient progressing well towards goals  Good execution of exercise with program  Patient demonstrated the appropriate amount of fatigue for program  Deferred MHP due to conflict in schedule Will continue to monitor pain levels and progress as able      Plan: Continue per plan of care  Progress treatment as tolerated  Added Thera ball core stabilization exercises

## 2018-08-22 ENCOUNTER — OFFICE VISIT (OUTPATIENT)
Dept: PHYSICAL THERAPY | Facility: CLINIC | Age: 43
End: 2018-08-22
Payer: COMMERCIAL

## 2018-08-22 DIAGNOSIS — S82.65XD CLOSED NONDISPLACED FRACTURE OF LATERAL MALLEOLUS OF LEFT FIBULA WITH ROUTINE HEALING: ICD-10-CM

## 2018-08-22 DIAGNOSIS — S82.65XD NONDISPLACED FRACTURE OF LATERAL MALLEOLUS OF LEFT FIBULA, SUBSEQUENT ENCOUNTER FOR CLOSED FRACTURE WITH ROUTINE HEALING: ICD-10-CM

## 2018-08-22 DIAGNOSIS — M25.572 ACUTE LEFT ANKLE PAIN: ICD-10-CM

## 2018-08-22 DIAGNOSIS — M54.42 ACUTE MIDLINE LOW BACK PAIN WITH LEFT-SIDED SCIATICA: Primary | ICD-10-CM

## 2018-08-22 PROCEDURE — 97110 THERAPEUTIC EXERCISES: CPT | Performed by: PHYSICAL THERAPIST

## 2018-08-22 PROCEDURE — 97112 NEUROMUSCULAR REEDUCATION: CPT | Performed by: PHYSICAL THERAPIST

## 2018-08-22 PROCEDURE — 97140 MANUAL THERAPY 1/> REGIONS: CPT | Performed by: PHYSICAL THERAPIST

## 2018-08-22 NOTE — PROGRESS NOTES
Daily Note     Today's date: 2018  Patient name: Amalia Cameron  : 1975  MRN: [id number]  Referring provider: Wally Enriquez PA-C  Dx:   Encounter Diagnosis     ICD-10-CM    1  Acute midline low back pain with left-sided sciatica M54 42    2  Acute left ankle pain M25 572    3  Closed nondisplaced fracture of lateral malleolus of left fibula with routine healing S82 65XD    4  Nondisplaced fracture of lateral malleolus of left fibula, subsequent encounter for closed fracture with routine healing S82 65XD        Start Time: 0800  Stop Time: 0945  Total time in clinic (min): 105 minutes    Subjective: Pt reports she is feeling pretty good today, no pain reported at the start of treatment, no new complaints noted at this time         Objective: See treatment diary below  Precautions: WBAT LLE; low back pain     Specialty Daily Treatment Diary      Manual  /   L foot/ankle PROM and joint mobs             BLE PROM  15 min  15 min 15 min  15 min  15 min    STM/TFM to the left piriformis   X                 Exercise Diary    8/9   SRC L5 10 min L5 10 min L5 10 min  L5 10 min  L5 10 min   Standing SLR 3-way on foam 2x10  2# 2x10  2# 2x10  2#  2x10  1 5#  2x10  2#   Monster walk 6x10 feet   Blue  8x10 feet  Blue  8x10 feet   Black   6x 10 feet  Blue   6x 10 feet  Blue   Side step and squat  6x10 feet  Blue  8x10 feet  Blue  8x10 feet  Black   6x 10 feet   blue  6x 10 feet  Blue   Fwd/Lat Step Ups 8" step  2x10 2x10  8" step 2x10  8" step  8" step  2x10  6" step  2x10   Step Up and Over            Eccentric Step Downs 8" step  2x10 2x10  8" step 2x10  8" step  2x10  6' step  2x10 6' step   BOSU step over and kick out 2x10 Bilat  20x 20x  10x bilat  15x   Bosu March 3 min  3 min 3 min  3 min  3 min   Bosu Lunge 2x10 Bilat  2x10 2x10  15x  15x    DF Heel slides             Single limb cone tap on foam 2x10 Bilat  2x10  Bilat  2x10  Bilat   2x10  bilat  2x10  bilat   PPT   20x5" hold 20x5" hold  20x 5" hd  20x 5' hd   Bridges   20x with PPT 20x with PPT 20x 20x   LTR/DKTC with Tball   20x each 20x each 2x15 each 2x15 each   Hip Abd/Add            Tball Abd Crunch             Sidelying Bridge                                                             Modalities 8/13 8/16 8/22 8/8 8/9   MHP to LB and piriformis in seated  15 min Deferred  15 min  MHP to LB  15 min  15 min low back                                     Assessment: Tolerated treatment well  Patient exhibited good technique with therapeutic exercises and would benefit from continued PT  Pt with good tolerance to exercises this visit  Progressed monster walks & side step and squat from blue to black tband with pt noting more of a challenge, but no increased pain  No adverse reaction noted to MHP application post treatment  Plan: Continue per plan of care  Progress treatment as tolerated

## 2018-08-24 ENCOUNTER — APPOINTMENT (OUTPATIENT)
Dept: PHYSICAL THERAPY | Facility: CLINIC | Age: 43
End: 2018-08-24
Payer: COMMERCIAL

## 2018-08-28 ENCOUNTER — OFFICE VISIT (OUTPATIENT)
Dept: PHYSICAL THERAPY | Facility: CLINIC | Age: 43
End: 2018-08-28
Payer: COMMERCIAL

## 2018-08-28 DIAGNOSIS — M54.42 ACUTE MIDLINE LOW BACK PAIN WITH LEFT-SIDED SCIATICA: Primary | ICD-10-CM

## 2018-08-28 DIAGNOSIS — M25.572 ACUTE LEFT ANKLE PAIN: ICD-10-CM

## 2018-08-28 DIAGNOSIS — S82.65XD CLOSED NONDISPLACED FRACTURE OF LATERAL MALLEOLUS OF LEFT FIBULA WITH ROUTINE HEALING: ICD-10-CM

## 2018-08-28 DIAGNOSIS — S82.65XD NONDISPLACED FRACTURE OF LATERAL MALLEOLUS OF LEFT FIBULA, SUBSEQUENT ENCOUNTER FOR CLOSED FRACTURE WITH ROUTINE HEALING: ICD-10-CM

## 2018-08-28 PROCEDURE — 97140 MANUAL THERAPY 1/> REGIONS: CPT | Performed by: PHYSICAL THERAPIST

## 2018-08-28 PROCEDURE — 97112 NEUROMUSCULAR REEDUCATION: CPT | Performed by: PHYSICAL THERAPIST

## 2018-08-28 PROCEDURE — 97110 THERAPEUTIC EXERCISES: CPT | Performed by: PHYSICAL THERAPIST

## 2018-08-28 NOTE — PROGRESS NOTES
Daily Note     Today's date: 2018  Patient name: Don Garcia  : 1975  MRN: [id number]  Referring provider: Ann Skiff, PA-C  Dx:   Encounter Diagnosis     ICD-10-CM    1  Acute midline low back pain with left-sided sciatica M54 42    2  Acute left ankle pain M25 572    3  Closed nondisplaced fracture of lateral malleolus of left fibula with routine healing S82 65XD    4  Nondisplaced fracture of lateral malleolus of left fibula, subsequent encounter for closed fracture with routine healing S82 65XD        Start Time: 1500  Stop Time: 1630  Total time in clinic (min): 90 minutes    Subjective: Pt reports she is feeling pretty good today  Reports she was a little sore after increases in exercises made during last visit, but no increased pain         Objective: See treatment diary below    Precautions: WBAT LLE; low back pain     Specialty Daily Treatment Diary      Manual    8/9   L foot/ankle PROM and joint mobs             BLE PROM  15 min  15 min 15 min  15 min  15 min    STM/TFM to the left piriformis   X                 Exercise Diary    8/9   SRC L5 10 min L5 10 min L5 10 min L5 10 min  L5 10 min   Standing SLR 3-way on foam 2x10  2# 2x10  2# 2x10  2# 2x10  2#  2x10  2#   Monster walk 6x10 feet   Blue  8x10 feet  Blue  8x10 feet   Black  8x10 feet  Black   6x 10 feet  Blue   Side step and squat  6x10 feet  Blue  8x10 feet  Blue  8x10 feet  Black  8x10 feet  Black   6x 10 feet  Blue   Fwd/Lat Step Ups 8" step  2x10 2x10  8" step 2x10  8" step 2x10  8" step  6" step  2x10   Step Up and Over            Eccentric Step Downs 8" step  2x10 2x10  8" step 2x10  8" step 2x10  8" step  2x10 6' step   BOSU step over and kick out 2x10 Bilat  20x 20x 20x  15x   Bosu March 3 min  3 min 3 min 3 min  3 min   Bosu Lunge 2x10 Bilat  2x10 2x10 2x10  15x    DF Heel slides             Single limb cone tap on foam 2x10 Bilat  2x10  Bilat  2x10  Bilat  2x10  Bilat   2x10  bilat PPT   20x5" hold 20x5" hold 20x5" hold  20x 5' hd   Bridges   20x with PPT 20x with PPT 20x with PPT 20x   LTR/DKTC with Tball   20x each 20x each 20x each 2x15 each   Hip Abd/Add            Tball Abd Crunch             Sidelying Bridge                                                             Modalities 8/13 8/16 8/22 8/28 8/9   MHP to LB and piriformis in seated  15 min Deferred  15 min  15 min  15 min low back                                     Assessment: Tolerated treatment well  Patient exhibited good technique with therapeutic exercises and would benefit from continued PT  Pt with good tolerance to exercises this visit  Noted minor increase in muscle soreness post treatment  No adverse reaction noted to MHP application post treatment  Plan: Continue per plan of care  Progress treatment as tolerated

## 2018-08-30 ENCOUNTER — OFFICE VISIT (OUTPATIENT)
Dept: PHYSICAL THERAPY | Facility: CLINIC | Age: 43
End: 2018-08-30
Payer: COMMERCIAL

## 2018-08-30 DIAGNOSIS — S82.65XD CLOSED NONDISPLACED FRACTURE OF LATERAL MALLEOLUS OF LEFT FIBULA WITH ROUTINE HEALING: ICD-10-CM

## 2018-08-30 DIAGNOSIS — M54.42 ACUTE MIDLINE LOW BACK PAIN WITH LEFT-SIDED SCIATICA: Primary | ICD-10-CM

## 2018-08-30 DIAGNOSIS — M25.572 ACUTE LEFT ANKLE PAIN: ICD-10-CM

## 2018-08-30 PROCEDURE — 97110 THERAPEUTIC EXERCISES: CPT

## 2018-08-30 PROCEDURE — 97140 MANUAL THERAPY 1/> REGIONS: CPT

## 2018-08-30 NOTE — PROGRESS NOTES
Daily Note     Today's date: 2018  Patient name: Barbara Portillo  : 1975  MRN: [id number]  Referring provider: Tyson Barrett PA-C  Dx:   Encounter Diagnosis     ICD-10-CM    1  Acute midline low back pain with left-sided sciatica M54 42    2  Acute left ankle pain M25 572    3  Closed nondisplaced fracture of lateral malleolus of left fibula with routine healing S82 65XD        Start Time: 830          Subjective: Patient stated she is walking better but still feeling a pulling in the back of her leg going down to her ankle        Objective: See treatment diary below    Precautions: WBAT LLE; low back pain     Specialty Daily Treatment Diary      Manual     L foot/ankle PROM and joint mobs             BLE PROM  15 min  15 min 15 min  15 min  15 min    STM/TFM to the left piriformis   X                 Exercise Diary     SRC L5 10 min L5 10 min L5 10 min L5 10 min  L5 10 min   Standing SLR 3-way on foam 2x10  2# 2x10  2# 2x10  2# 2x10  2#  2x10  2 5#   Monster walk 6x10 feet   Blue  8x10 feet  Blue  8x10 feet   Black  8x10 feet  Black   8x 10 feet  Black   Side step and squat  6x10 feet  Blue  8x10 feet  Blue  8x10 feet  Black  8x10 feet  Black   8x 10 feet  Black   Fwd/Lat Step Ups 8" step  2x10 2x10  8" step 2x10  8" step 2x10  8" step  8" step  2x10   Step Up and Over            Eccentric Step Downs 8" step  2x10 2x10  8" step 2x10  8" step 2x10  8" step  2x10 8' step   BOSU step over and kick out 2x10 Bilat  20x 20x 20x  20x   Bosu March 3 min  3 min 3 min 3 min  3 min   Bosu Lunge 2x10 Bilat  2x10 2x10 2x10  15x    DF Heel slides             Single limb cone tap on foam 2x10 Bilat  2x10  Bilat  2x10  Bilat  2x10  Bilat   2x10  bilat   PPT   20x5" hold 20x5" hold 20x5" hold  20x 5' hd   Bridges   20x with PPT 20x with PPT 20x with PPT 20x   LTR/DKTC with Tball   20x each 20x each 20x each 2x15 each   Hip Abd/Add            Tball Abd Crunch           Sidelying Bridge                                                             Modalities 8/13 8/16 8/22 8/28 8/30   MHP to LB and piriformis in seated  15 min Deferred  15 min  15 min  15 min low back                                     Assessment: slight progression with program today  Patient progressing well towards goals  Good execution of exercise with program  Patient demonstrated the appropriate amount of fatigue for program   Will continue to monitor pain levels and progress as able      Plan: Continue per plan of care  Progress treatment as tolerated

## 2018-09-04 ENCOUNTER — APPOINTMENT (OUTPATIENT)
Dept: PHYSICAL THERAPY | Facility: CLINIC | Age: 43
End: 2018-09-04
Payer: COMMERCIAL

## 2018-09-05 ENCOUNTER — OFFICE VISIT (OUTPATIENT)
Dept: PHYSICAL THERAPY | Facility: CLINIC | Age: 43
End: 2018-09-05
Payer: COMMERCIAL

## 2018-09-05 DIAGNOSIS — S82.65XD CLOSED NONDISPLACED FRACTURE OF LATERAL MALLEOLUS OF LEFT FIBULA WITH ROUTINE HEALING: ICD-10-CM

## 2018-09-05 DIAGNOSIS — M25.572 ACUTE LEFT ANKLE PAIN: ICD-10-CM

## 2018-09-05 DIAGNOSIS — M54.42 ACUTE MIDLINE LOW BACK PAIN WITH LEFT-SIDED SCIATICA: Primary | ICD-10-CM

## 2018-09-05 DIAGNOSIS — S82.65XD NONDISPLACED FRACTURE OF LATERAL MALLEOLUS OF LEFT FIBULA, SUBSEQUENT ENCOUNTER FOR CLOSED FRACTURE WITH ROUTINE HEALING: ICD-10-CM

## 2018-09-05 PROCEDURE — 97110 THERAPEUTIC EXERCISES: CPT | Performed by: PHYSICAL THERAPIST

## 2018-09-05 PROCEDURE — 97140 MANUAL THERAPY 1/> REGIONS: CPT | Performed by: PHYSICAL THERAPIST

## 2018-09-05 NOTE — PROGRESS NOTES
Daily Note     Today's date: 2018  Patient name: Brandon Rodarte  : 1975  MRN: [id number]  Referring provider: Yuriy Goetz PA-C  Dx:   Encounter Diagnosis     ICD-10-CM    1  Acute midline low back pain with left-sided sciatica M54 42    2  Acute left ankle pain M25 572    3  Closed nondisplaced fracture of lateral malleolus of left fibula with routine healing S82 65XD    4  Nondisplaced fracture of lateral malleolus of left fibula, subsequent encounter for closed fracture with routine healing S82 65XD                   Subjective:  Patient reports feeling better with overall decrease pain levels in the low back and left lLE wth only occasional minimal numbness in the left ankle and foot  Patient reports 1/10 soreness in the low back and left hip t/o the session         Objective: See treatment diary below    Precautions: WBAT LLE; low back pain     Specialty Daily Treatment Diary      Manual         L foot/ankle PROM and joint mobs         BLE PROM  15 min         STM/TFM to the left piriformis   X             Exercise Diary     [address] [place] [address] L5 10 min L5 10 min L5 10 min L5 10 min  L5 10 min   Standing SLR 3-way on foam 2x10  2 5# 2x10  2# 2x10  2# 2x10  2#  2x10  2 5#   Monster walk 8x10 feet   Black  8x10 feet  Blue  8x10 feet   Black  8x10 feet  Black   8x 10 feet  Black   Side step and squat  8x10 feet  Black  8x10 feet  Blue  8x10 feet  Black  8x10 feet  Black   8x 10 feet  Black   Fwd/Lat Step Ups 8" step  2x10 2x10  8" step 2x10  8" step 2x10  8" step  8" step  2x10   Step Up and Over            Eccentric Step Downs 8" step  2x10 2x10  8" step 2x10  8" step 2x10  8" step  2x10 8' step   BOSU step over and kick out 2x10 Bilat  20x 20x 20x  20x   Bosu March 3 min  3 min 3 min 3 min  3 min   Bosu Lunge 2x10 Bilat   Front/lateral  2x10 2x10 2x10  15x    DF Heel slides             Single limb cone tap on foam 2x10 Bilat  2x10  Bilat  2x10  Bilat  2x10  Bilat   2x10  bilat   PPT   20x5" hold 20x5" hold 20x5" hold  20x 5' hd   Bridges   20x with PPT 20x with PPT 20x with PPT 20x   LTR/DKTC with Tball  2x10 Bilat 20x each 20x each 20x each 2x15 each   Hip Abd/Add            Tball Abd Crunch             Sidelying Bridge                                                             Modalities 9/5       MHP to LB and piriformis in seated  15 min                                           Assessment: Tolerated treatment well  PT notes continuation of progression of TE program with focus on lumbar stabilization and manual therapy to decrease pain levels and improve functional limitations to meet therapy goals  PT notes continuation of + inflammation/tightness of the left piriformis with need for continuation of skilled therapy  Plan: Continue per plan of care

## 2018-09-06 ENCOUNTER — OFFICE VISIT (OUTPATIENT)
Dept: PHYSICAL THERAPY | Facility: CLINIC | Age: 43
End: 2018-09-06
Payer: COMMERCIAL

## 2018-09-06 DIAGNOSIS — M54.42 ACUTE MIDLINE LOW BACK PAIN WITH LEFT-SIDED SCIATICA: Primary | ICD-10-CM

## 2018-09-06 DIAGNOSIS — S82.65XD NONDISPLACED FRACTURE OF LATERAL MALLEOLUS OF LEFT FIBULA, SUBSEQUENT ENCOUNTER FOR CLOSED FRACTURE WITH ROUTINE HEALING: ICD-10-CM

## 2018-09-06 DIAGNOSIS — S82.65XD CLOSED NONDISPLACED FRACTURE OF LATERAL MALLEOLUS OF LEFT FIBULA WITH ROUTINE HEALING: ICD-10-CM

## 2018-09-06 DIAGNOSIS — M25.572 ACUTE LEFT ANKLE PAIN: ICD-10-CM

## 2018-09-06 PROCEDURE — 97140 MANUAL THERAPY 1/> REGIONS: CPT | Performed by: PHYSICAL THERAPIST

## 2018-09-06 PROCEDURE — 97110 THERAPEUTIC EXERCISES: CPT | Performed by: PHYSICAL THERAPIST

## 2018-09-06 NOTE — PROGRESS NOTES
Daily Note     Today's date: 2018  Patient name: Kaylee Chao  : 1975  MRN: [id number]  Referring provider: Marin Montana PA-C  Dx:   Encounter Diagnosis     ICD-10-CM    1  Acute midline low back pain with left-sided sciatica M54 42    2  Acute left ankle pain M25 572    3  Closed nondisplaced fracture of lateral malleolus of left fibula with routine healing S82 65XD    4  Nondisplaced fracture of lateral malleolus of left fibula, subsequent encounter for closed fracture with routine healing S82 65XD                   Subjective:  Patient reports minimal numbness in the left ankle  Patient reports she is very happy with current progress         Objective: See treatment diary below    Precautions: WBAT LLE; low back pain     Specialty Daily Treatment Diary      Manual        L foot/ankle PROM and joint mobs         BLE PROM  15 min  15 min       STM/TFM to the left piriformis   X X            Exercise Diary     Treadmill 10 min  1 5 MPH  10 min  1 5 MPH  L5 10 min L5 10 min  L5 10 min   Standing SLR 3-way on foam 2x10  2 5# 2x10  2 5# 2x10  2# 2x10  2#  2x10  2 5#   Monster walk 8x10 feet   Black  NT 8x10 feet   Black  8x10 feet  Black   8x 10 feet  Black   Side step and squat  8x10 feet  Black  NT 8x10 feet  Black  8x10 feet  Black   8x 10 feet  Black   Fwd/Lat Step Ups 8" step  2x10 2x10  8" step 2x10  8" step 2x10  8" step  8" step  2x10   Step Up and Over            Eccentric Step Downs 8" step  2x10 2x10  8" step 2x10  8" step 2x10  8" step  2x10 8' step   BOSU step over and kick out 2x10 Bilat  20x 20x 20x  20x   Bosu March 3 min  3 min 3 min 3 min  3 min   Bosu Lunge 2x10 Bilat   Front/lateral  2x10 2x10 2x10  15x    DF Heel slides             Single limb cone tap on foam 2x10 Bilat  2x10  Bilat  2x10  Bilat  2x10  Bilat   2x10  bilat   PPT   20x5" hold 20x5" hold 20x5" hold  20x 5' hd   Bridges  with Orange Tball  2x10  20x with PPT 20x with PPT 20x with PPT 20x LTR/DKTC with Tball  2x10 Bilat 20x each 20x each 20x each 2x15 each   Hip Abd/Add            Tball Abd Crunch             Sidelying Bridge                                                             Modalities 9/5 9/6      MHP to LB and piriformis in seated  15 min 15 min                                           Assessment: Tolerated treatment well  PT notes modified treatment secondary to patient with fatigue in the LE after PT session yesterday  PT will continue to progress toward therapy goals and full TE session as tolerated by patient  Plan: Continue per plan of care

## 2018-09-11 ENCOUNTER — OFFICE VISIT (OUTPATIENT)
Dept: PHYSICAL THERAPY | Facility: CLINIC | Age: 43
End: 2018-09-11
Payer: COMMERCIAL

## 2018-09-11 DIAGNOSIS — M54.42 ACUTE MIDLINE LOW BACK PAIN WITH LEFT-SIDED SCIATICA: Primary | ICD-10-CM

## 2018-09-11 DIAGNOSIS — S82.65XD CLOSED NONDISPLACED FRACTURE OF LATERAL MALLEOLUS OF LEFT FIBULA WITH ROUTINE HEALING: ICD-10-CM

## 2018-09-11 DIAGNOSIS — M25.572 ACUTE LEFT ANKLE PAIN: ICD-10-CM

## 2018-09-11 DIAGNOSIS — S82.65XD NONDISPLACED FRACTURE OF LATERAL MALLEOLUS OF LEFT FIBULA, SUBSEQUENT ENCOUNTER FOR CLOSED FRACTURE WITH ROUTINE HEALING: ICD-10-CM

## 2018-09-11 PROCEDURE — 97110 THERAPEUTIC EXERCISES: CPT | Performed by: PHYSICAL THERAPIST

## 2018-09-11 PROCEDURE — 97140 MANUAL THERAPY 1/> REGIONS: CPT | Performed by: PHYSICAL THERAPIST

## 2018-09-11 NOTE — PROGRESS NOTES
Daily Note     Today's date: 2018  Patient name: Don Garcia  : 1975  MRN: [id number]  Referring provider: Ann Skiff, PA-C  Dx:   Encounter Diagnosis     ICD-10-CM    1  Acute midline low back pain with left-sided sciatica M54 42    2  Acute left ankle pain M25 572    3  Closed nondisplaced fracture of lateral malleolus of left fibula with routine healing S82 65XD    4  Nondisplaced fracture of lateral malleolus of left fibula, subsequent encounter for closed fracture with routine healing S82 65XD        Start Time: 1400  Stop Time: 1525  Total time in clinic (min): 85 minutes    Subjective: Pt reports she is feeling pretty good today with no pain reported at the start of treatment         Objective: See treatment diary below  Precautions: WBAT LLE; low back pain     Specialty Daily Treatment Diary      Manual         L foot/ankle PROM and joint mobs             BLE PROM  15 min  15 min 15 min        STM/TFM to the left piriformis   X X X             Exercise Diary       Treadmill 10 min  1 5 MPH  10 min  1 5 MPH  10 min  1 5 mph     Standing SLR 3-way on foam 2x10  2 5# 2x10  2 5# 2x10  2 5#     Monster walk 8x10 feet   Black  NT 8x10 ft  Black      Side step and squat  8x10 feet  Black  NT 8x10 ft  Black      Fwd/Lat Step Ups 8" step  2x10 2x10  8" step 2x10  8" step     Step Up and Over          Eccentric Step Downs 8" step  2x10 2x10  8" step 2x10  8" step     BOSU step over and kick out 2x10 Bilat  20x 20x     Bosu March 3 min  3 min 3 min     Bosu Lunge 2x10 Bilat   Front/lateral  2x10 2x10      DF Heel slides           Single limb cone tap on foam 2x10 Bilat  2x10  Bilat  2x10  Bilat      PPT   20x5" hold 20x5" hold     Bridges  with Orange Tball  2x10  20x with PPT 20x with PPT      LTR/DKTC with Tball  2x10 Bilat 20x each 20x each      Hip Abd/Add          Tball Abd Crunch           Sidelying Bridge                                                           Modalities 9/5 9/6 9/11       MHP to LB and piriformis in seated  15 min 15 min   15 min                                         Assessment: Tolerated treatment well  Patient exhibited good technique with therapeutic exercises and would benefit from continued PT  Pt with good tolerance to exercises this visit with no increased pain noted  Pt reports she feels comfortable with all exercises performed and is agreeable to d/c with HEP next visit  Plan: Continue per plan of care  Potential discharge next visit

## 2018-09-13 ENCOUNTER — OFFICE VISIT (OUTPATIENT)
Dept: PHYSICAL THERAPY | Facility: CLINIC | Age: 43
End: 2018-09-13
Payer: COMMERCIAL

## 2018-09-13 DIAGNOSIS — S82.65XD CLOSED NONDISPLACED FRACTURE OF LATERAL MALLEOLUS OF LEFT FIBULA WITH ROUTINE HEALING: ICD-10-CM

## 2018-09-13 DIAGNOSIS — S82.65XD NONDISPLACED FRACTURE OF LATERAL MALLEOLUS OF LEFT FIBULA, SUBSEQUENT ENCOUNTER FOR CLOSED FRACTURE WITH ROUTINE HEALING: ICD-10-CM

## 2018-09-13 DIAGNOSIS — M54.42 ACUTE MIDLINE LOW BACK PAIN WITH LEFT-SIDED SCIATICA: Primary | ICD-10-CM

## 2018-09-13 DIAGNOSIS — M25.572 ACUTE LEFT ANKLE PAIN: ICD-10-CM

## 2018-09-13 PROCEDURE — 97535 SELF CARE MNGMENT TRAINING: CPT | Performed by: PHYSICAL THERAPIST

## 2018-09-13 PROCEDURE — G8979 MOBILITY GOAL STATUS: HCPCS | Performed by: PHYSICAL THERAPIST

## 2018-09-13 PROCEDURE — G8980 MOBILITY D/C STATUS: HCPCS | Performed by: PHYSICAL THERAPIST

## 2018-09-13 PROCEDURE — 97140 MANUAL THERAPY 1/> REGIONS: CPT | Performed by: PHYSICAL THERAPIST

## 2018-09-13 PROCEDURE — 97110 THERAPEUTIC EXERCISES: CPT | Performed by: PHYSICAL THERAPIST

## 2018-09-13 NOTE — PROGRESS NOTES
PT Discharge    Today's date: 2018  Patient name: Margarita Gandara  : 1975  MRN: [id number]  Referring provider: Dmitriy Castro PA-C  Dx:   Encounter Diagnosis     ICD-10-CM    1  Acute midline low back pain with left-sided sciatica M54 42    2  Acute left ankle pain M25 572    3  Closed nondisplaced fracture of lateral malleolus of left fibula with routine healing S82 65XD    4  Nondisplaced fracture of lateral malleolus of left fibula, subsequent encounter for closed fracture with routine healing S82 65XD                   Assessment  Impairments: abnormal gait, abnormal or restricted ROM, activity intolerance, impaired balance, impaired physical strength, lacks appropriate home exercise program, pain with function and weight-bearing intolerance    Assessment details: PT notes the patient has met all therapy goals and is ready for a DC with HEP  Understanding of Dx/Px/POC: good   Prognosis: good    Goals  STG  1  Pt will demonstrate 0/10 pain with ADLs and functional mobility- MET  2  Pt will improve AROM of the L ankle to WNL- MET  3  Initiate HEP with good tolerance from pt- MET    LTG   1  Pt will improve standing static and dynamic balance to WNL- MET  2  Pt will demonstrate 5/5 t/o BLE- MET  3  Pt will be independent with HEP and appropriate for d/c- MET  4  Pt will improve core strength to WFL-MET   5   Pt will demonstrate no radiating symptoms into the LLE-MET     Plan  Patient would benefit from: PT eval and skilled physical therapy  Planned modality interventions: cryotherapy, unattended electrical stimulation and thermotherapy: hydrocollator packs  Planned therapy interventions: joint mobilization, massage, manual therapy, neuromuscular re-education, balance, balance/weight bearing training, flexibility, gait training, graded exercise, home exercise program, patient education, postural training, strengthening, stretching, therapeutic exercise and abdominal trunk stabilization  Frequency: 2x week  Duration in visits: 1  Duration in weeks: 1  Treatment plan discussed with: patient  Plan details: DC with HEP  Subjective Evaluation    History of Present Illness  Date of onset: 2018  Mechanism of injury: Pt presents s/p fall resulting in non-displaced fx of the L lateral malleolus on 18  Pt reports she was going down on the stairs and felt the ankle give out while she was stepping down onto her left foot  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, and has rolled her L ankle several times, but none as serious as this  Patient was initially in short cast with NWB but then transitioned to CAM boot with WBAT and finally patient DC from boot to FWB  Pt is scheduled to f/u with MD on   Presently, pt has attended 21 sessions of OPPT since initial evaluation and feels 90% improvement with overall increase movement and strength of the bilateral legs  Patient reports she has returned to all activities with no limitations  Patient reports she is happy with current progress and is ready for a DC with HEP  Not a recurrent problem   Quality of life: good    Pain  Current pain ratin  At best pain ratin  At worst pain ratin  Location: Left ankle and low back  Quality: discomfort and pulling  Relieving factors: rest and ice  Aggravating factors: standing and walking  Progression: improved      Diagnostic Tests  X-ray: abnormal  Treatments  No previous or current treatments  Current treatment: chiropractic and physical therapy  Patient Goals  Patient goals for therapy: decreased edema, decreased pain, improved balance, increased motion, increased strength and return to sport/leisure activities        Objective     Palpation     Additional Palpation Details  Pt notes no TTP of b/l LE musculature     Tenderness   Left Ankle/Foot   No tenderness  Right Ankle/Foot   No tenderness       Neurological Testing     Reflexes   Left   Patellar (L4): normal (2+)  Achilles (S1): normal (2+)    Right   Patellar (L4): normal (2+)  Achilles (S1): normal (2+)    Active Range of Motion   Left Hip   Normal active range of motion    Right Hip   Normal active range of motion  Left Ankle/Foot   Normal active range of motion    Right Ankle/Foot   Normal active range of motion    Additional Active Range of Motion Details  PT notes the patient with WFL AROM of B/L ankles    Strength/Myotome Testing     Left Hip   Planes of Motion   Flexion: 5  Extension: 5  Abduction: 5  Adduction: 5  External rotation: 5  Internal rotation: 5    Right Hip   Planes of Motion   Flexion: 5  Extension: 5  Abduction: 5  Adduction: 5  External rotation: 5  Internal rotation: 5    Left Knee   Flexion: 5  Extension: 5    Right Knee   Flexion: 5  Extension: 5    Left Ankle/Foot   Dorsiflexion: 5  Plantar flexion: 5  Inversion: 5  Eversion: 5    Right Ankle/Foot   Normal strength  Dorsiflexion: 5  Plantar flexion: 5  Inversion: 5  Eversion: 5    Tests       Thoracic   Negative slump  Lumbar   Positive repeated extension  Negative repeated flexion and slump  Left   Positive crossed SLR  Negative femoral stretch, passive SLR, quadrant, sural bias, tibial bias and vertical compression  Left Pelvic Girdle/Sacrum   Negative: sacrum compression and gapping  Left Hip   Negative Alfreda and piriformis  Left Knee   Negative peroneal nerve tension  Additional Tests Details  Pt reported tightness in the low back/posterior gluteal region with repeated extension and tingling in L toes  Ambulation   Weight-Bearing Status   Assistive device used: none    Additional Weight-Bearing Status Details  FWB, no assitive device or CAM boot with no ankle support     Observational Gait   Gait: within functional limits   Walking speed and stride length within functional limits     Left arm swing: within functional limits  Right arm swing: within functional limits  Base of support: normal      Flowsheet Rows      Most Recent Value PT/OT G-Codes   Current Score  66   Projected Score  64   FOTO information reviewed  Yes   Assessment Type  Discharge   G code set  Mobility: Walking & Moving Around   Mobility: Walking and Moving Around Goal Status ()  CI   Mobility: Walking and Moving Around Discharge Status ()  CI        Precautions: WBAT LLE; low back pain     Specialty Daily Treatment Diary      Manual  9/5 9/6 9/11 9/13     L foot/ankle PROM and joint mobs             BLE PROM  15 min  15 min 15 min  15 min       STM/TFM to the left piriformis  Sirishaven Tani           Exercise Diary  9/5 9/6 9/11 9/13    Treadmill 10 min  1 5 MPH  10 min  1 5 MPH  10 min  1 5 mph 10 min   2 0 MPH     Standing SLR 3-way on foam 2x10  2 5# 2x10  2 5# 2x10  2 5#     Monster walk 8x10 feet   Black  NT 8x10 ft  Black      Side step and squat  8x10 feet  Black  NT 8x10 ft  Black      Fwd/Lat Step Ups 8" step  2x10 2x10  8" step 2x10  8" step     Step Up and Over          Eccentric Step Downs 8" step  2x10 2x10  8" step 2x10  8" step     BOSU step over and kick out 2x10 Bilat  20x 20x     Bosu March 3 min  3 min 3 min     Bosu Lunge 2x10 Bilat   Front/lateral  2x10 2x10      DF Heel slides           Single limb cone tap on foam 2x10 Bilat  2x10  Bilat  2x10  Bilat      PPT   20x5" hold 20x5" hold     Bridges  with Orange Tball  2x10  20x with PPT 20x with PPT      LTR/DKTC with Tball  2x10 Bilat 20x each 20x each      Hip Abd/Add          Tball Abd Crunch           Sidelying Bridge             HEP update/review         X                                       Modalities 9/5 9/6 9/11 9/13     MHP to LB and piriformis in seated  15 min 15 min   15 min  15 min

## 2024-04-01 ENCOUNTER — VBI (OUTPATIENT)
Dept: ADMINISTRATIVE | Facility: OTHER | Age: 49
End: 2024-04-01

## 2024-09-05 ENCOUNTER — VBI (OUTPATIENT)
Dept: ADMINISTRATIVE | Facility: OTHER | Age: 49
End: 2024-09-05

## 2024-09-05 NOTE — TELEPHONE ENCOUNTER
09/05/24 7:44 AM     Chart reviewed for Pap Smear (HPV) aka Cervical Cancer Screening ; nothing is submitted to the patient's insurance at this time.     Jane Yepez MA   [facility]

## 2024-09-19 NOTE — ASSESSMENT & PLAN NOTE
Continue physical therapy  Activities as tolerated  I discussed with patient with regard to her low back issue  As long as her symptom is improving I recommend her to continue therapy  I advised her to contact us if her symptoms changed  We will see patient back as needed  All patient's questions were answered to her satisfaction  This note is created using dictation transcription  It may contain typographical errors, grammatical errors, improperly dictated words, background noise and other errors  home

## 2024-11-20 ENCOUNTER — VBI (OUTPATIENT)
Dept: ADMINISTRATIVE | Facility: OTHER | Age: 49
End: 2024-11-20

## 2024-11-20 NOTE — TELEPHONE ENCOUNTER
11/20/24 6:50 AM     Chart reviewed for Hemoglobin A1c ; nothing is submitted to the patient's insurance at this time.     Jane Yepez MA   [facility]

## 2024-12-29 ENCOUNTER — VBI (OUTPATIENT)
Dept: ADMINISTRATIVE | Facility: OTHER | Age: 49
End: 2024-12-29

## 2024-12-29 NOTE — TELEPHONE ENCOUNTER
12/29/24 6:19 PM     Chart reviewed for Pap Smear (HPV) aka Cervical Cancer Screening ; nothing is submitted to the patient's insurance at this time.     Jane Yepez MA   [facility]